# Patient Record
Sex: MALE | Race: WHITE | Employment: FULL TIME | URBAN - METROPOLITAN AREA
[De-identification: names, ages, dates, MRNs, and addresses within clinical notes are randomized per-mention and may not be internally consistent; named-entity substitution may affect disease eponyms.]

---

## 2017-01-31 RX ORDER — DEXTROAMPHETAMINE SACCHARATE, AMPHETAMINE ASPARTATE MONOHYDRATE, DEXTROAMPHETAMINE SULFATE AND AMPHETAMINE SULFATE 5; 5; 5; 5 MG/1; MG/1; MG/1; MG/1
20 CAPSULE, EXTENDED RELEASE ORAL
Qty: 90 CAP | Refills: 0 | Status: SHIPPED | OUTPATIENT
Start: 2017-01-31 | End: 2017-02-17 | Stop reason: SDUPTHER

## 2017-01-31 NOTE — TELEPHONE ENCOUNTER
Last date seen:8/12/16  Last date filled:11/25/16     [facility], No discrepancies were found.

## 2017-02-17 ENCOUNTER — OFFICE VISIT (OUTPATIENT)
Dept: INTERNAL MEDICINE CLINIC | Age: 57
End: 2017-02-17

## 2017-02-17 VITALS
DIASTOLIC BLOOD PRESSURE: 78 MMHG | RESPIRATION RATE: 12 BRPM | TEMPERATURE: 98.7 F | HEIGHT: 69 IN | OXYGEN SATURATION: 98 % | WEIGHT: 236.2 LBS | SYSTOLIC BLOOD PRESSURE: 126 MMHG | HEART RATE: 91 BPM | BODY MASS INDEX: 34.98 KG/M2

## 2017-02-17 DIAGNOSIS — Z00.00 ROUTINE GENERAL MEDICAL EXAMINATION AT A HEALTH CARE FACILITY: ICD-10-CM

## 2017-02-17 DIAGNOSIS — F90.0 ATTENTION DEFICIT HYPERACTIVITY DISORDER (ADHD), PREDOMINANTLY INATTENTIVE TYPE: Primary | ICD-10-CM

## 2017-02-17 RX ORDER — DEXTROAMPHETAMINE SACCHARATE, AMPHETAMINE ASPARTATE MONOHYDRATE, DEXTROAMPHETAMINE SULFATE AND AMPHETAMINE SULFATE 5; 5; 5; 5 MG/1; MG/1; MG/1; MG/1
20 CAPSULE, EXTENDED RELEASE ORAL
Qty: 90 CAP | Refills: 0 | Status: SHIPPED | OUTPATIENT
Start: 2017-02-17 | End: 2017-06-19 | Stop reason: SDUPTHER

## 2017-02-17 NOTE — MR AVS SNAPSHOT
Visit Information Date & Time Provider Department Dept. Phone Encounter #  
 2/17/2017  3:30 PM Sid Bridges MD Internist of [facility] [id number] Your Appointments 6/19/2017 12:30 PM  
PHYSICAL with Sid Bridges MD  
Internist of [facility]) Appt Note: rpe 6mos. rm  
 [address], [address] [place] Maureen [address] [place]  
  
   
 [address], [address] Upcoming Health Maintenance Date Due Pneumococcal 19-64 Highest Risk (2 of 3 - PCV13) 8/12/2017 COLONOSCOPY 8/8/2024 DTaP/Tdap/Td series (2 - Td) 7/24/2025 Allergies as of 2/17/2017  Review Complete On: 2/17/2017 By: Sid Bridges MD  
 No Known Allergies Current Immunizations  Reviewed on 8/10/2015 Name Date  
 TD Vaccine 7/3/2007 Tdap 7/24/2015 12:37 PM  
  
 Not reviewed this visit You Were Diagnosed With   
  
 Codes Comments Attention deficit hyperactivity disorder (ADHD), predominantly inattentive type    -  Primary ICD-10-CM: F90.0 ICD-9-CM: 314.00 Routine general medical examination at a health care facility     ICD-10-CM: Z00.00 ICD-9-CM: V70.0 Vitals BP Pulse Temp Resp Height(growth percentile) Weight(growth percentile) 126/78 91 98.7 °F (37.1 °C) (Oral) 12 5' 8.5\" (1.74 m) 236 lb 3.2 oz (107.1 kg) SpO2 BMI Smoking Status 98% 35.39 kg/m2 Never Smoker Vitals History BMI and BSA Data Body Mass Index Body Surface Area  
 35.39 kg/m 2 2.28 m 2 Preferred Pharmacy Pharmacy Name Phone [address], [address] [place] [address] [phone] [facility] [phone] Your Updated Medication List  
  
   
This list is accurate as of: 2/17/17  4:01 PM.  Always use your most recent med list.  
  
  
  
  
 ALEVE 220 mg tablet Generic drug:  naproxen sodium Take 220 mg by mouth two (2) times daily (with meals). amphetamine-dextroamphetamine XR 20 mg XR capsule Commonly known as:  ADDERALL XR Take 1 Cap (20 mg total) by mouth every morningEarliest Fill Date: 2/17/17.  
  
 multivitamin tablet Commonly known as:  ONE A DAY Take 1 Tab by mouth daily. Prescriptions Printed Refills  
 amphetamine-dextroamphetamine XR (ADDERALL XR) 20 mg XR capsule 0 Sig: Take 1 Cap (20 mg total) by mouth every morningEarliest Fill Date: 2/17/17. Class: Print Route: Oral  
  
Introducing [facility] SERVICES! Elif Christian introduces [employer] patient portal. Now you can access parts of your medical record, email your doctor's office, and request medication refills online. 1. In your internet browser, go to [email or web address] 2. Click on the First Time User? Click Here link in the Sign In box. You will see the New Member Sign Up page. 3. Enter your [employer] Access Code exactly as it appears below. You will not need to use this code after youve completed the sign-up process. If you do not sign up before the expiration date, you must request a new code. · [employer] Access Code: [email or web address] Expires: 5/18/2017  3:37 PM 
 
4. Enter the last four digits of your Social Security Number (xxxx) and Date of Birth (mm/dd/yyyy) as indicated and click Submit. You will be taken to the next sign-up page. 5. Create a [employer] ID. This will be your [employer] login ID and cannot be changed, so think of one that is secure and easy to remember. 6. Create a [employer] password. You can change your password at any time. 7. Enter your Password Reset Question and Answer. This can be used at a later time if you forget your password. 8. Enter your e-mail address. You will receive e-mail notification when new information is available in [address] 9. Click Sign Up. You can now view and download portions of your medical record. 10. Click the Download Summary menu link to download a portable copy of your medical information. If you have questions, please visit the Frequently Asked Questions section of the [employer] website. Remember, [employer] is NOT to be used for urgent needs. For medical emergencies, dial 911. Now available from your iPhone and Android! Please provide this summary of care documentation to your next provider. Your primary care clinician is listed as Claudia Moody. Estrella Vera. If you have any questions after today's visit, please call [phone].

## 2017-02-17 NOTE — PROGRESS NOTES
Africa Bonifacio 1960, is a 49 y.o. male, who is seen today for reevaluation of ADD, mild obesity also noting a rash recently. He felt something a little itchy 4 days ago and then 3 days ago noticed a definite rash in the right antecubital fossa. He thinks it might be poison ivy but has not really had much itching. He has tried calamine lotion a couple of times and that seems to help a little bit. He is doing well with Adderall as far as functioning and feels great overall but remains mildly obese, weight unchanged from a year ago. He would like to lose some weight. Past Medical History   Diagnosis Date    ADHD (attention deficit hyperactivity disorder)     FHx: prostate cancer     Heart burn      Hiatal hernia    Prostate CA (HCC)      T1 C. NX MX [place] score 3+3 adenocarcinoma of the prostate. Prebiopsy PSA was 6.2     Current Outpatient Prescriptions   Medication Sig Dispense Refill    amphetamine-dextroamphetamine XR (ADDERALL XR) 20 mg XR capsule Take 1 Cap (20 mg total) by mouth every morningEarliest Fill Date: 2/17/17. 90 Cap 0    naproxen sodium (ALEVE) 220 mg tablet Take 220 mg by mouth two (2) times daily (with meals).  multivitamin (ONE A DAY) tablet Take 1 Tab by mouth daily. Visit Vitals    /78    Pulse 91    Temp 98.7 °F (37.1 °C) (Oral)    Resp 12    Ht 5' 8.5\" (1.74 m)    Wt 236 lb 3.2 oz (107.1 kg)    SpO2 98%    BMI 35.39 kg/m2     Lungs are clear to percussion. Good breath sounds with no wheezing or crackles. Heart reveals a regular rhythm with normal S1 and S2 and no murmur gallop click or rub. Abdomen is soft and nontender with no obvious hepatosplenomegaly or masses and no bruits. There is a slight redness in the entire antecubital fossa on the right. Assessment: #1. ADD doing well. He will continue Adderall XR 20 mg daily. #2.  Probable poison ivy but minimally symptomatic. No treatment is indicated.   He will call me if the rash developed significant itching in which case triamcinolone to be used. #3.  Obesity unchanged from a year ago, body mass index 35.4. Advised him that he really should be gradually working on weight loss by cutting back calories especially. Follow-up 6 months for physical    Kavita Price. Kimi Chin MD FACP    Please note: This document has been produced using voice recognition software. Unrecognized errors in transcription may be present.

## 2017-06-19 ENCOUNTER — OFFICE VISIT (OUTPATIENT)
Dept: INTERNAL MEDICINE CLINIC | Age: 57
End: 2017-06-19

## 2017-06-19 VITALS
RESPIRATION RATE: 14 BRPM | WEIGHT: 239 LBS | OXYGEN SATURATION: 97 % | BODY MASS INDEX: 36.22 KG/M2 | SYSTOLIC BLOOD PRESSURE: 124 MMHG | HEART RATE: 103 BPM | DIASTOLIC BLOOD PRESSURE: 80 MMHG | HEIGHT: 68 IN | TEMPERATURE: 98.7 F

## 2017-06-19 DIAGNOSIS — Z00.00 ROUTINE GENERAL MEDICAL EXAMINATION AT A HEALTH CARE FACILITY: Primary | ICD-10-CM

## 2017-06-19 DIAGNOSIS — F90.0 ATTENTION DEFICIT HYPERACTIVITY DISORDER (ADHD), PREDOMINANTLY INATTENTIVE TYPE: ICD-10-CM

## 2017-06-19 RX ORDER — DEXTROAMPHETAMINE SACCHARATE, AMPHETAMINE ASPARTATE MONOHYDRATE, DEXTROAMPHETAMINE SULFATE AND AMPHETAMINE SULFATE 5; 5; 5; 5 MG/1; MG/1; MG/1; MG/1
20 CAPSULE, EXTENDED RELEASE ORAL
Qty: 90 CAP | Refills: 0 | Status: SHIPPED | OUTPATIENT
Start: 2017-06-19 | End: 2017-11-02 | Stop reason: SDUPTHER

## 2017-06-19 NOTE — PATIENT INSTRUCTIONS
Advance Directives: Care Instructions  Your Care Instructions  An advance directive is a legal way to state your wishes at the end of your life. It tells your family and your doctor what to do if you can no longer say what you want. There are two main types of advance directives. You can change them any time that your wishes change. · A living will tells your family and your doctor your wishes about life support and other treatment. · A durable power of  for health care lets you name a person to make treatment decisions for you when you can't speak for yourself. This person is called a health care agent. If you do not have an advance directive, decisions about your medical care may be made by a doctor or a  who doesn't know you. It may help to think of an advance directive as a gift to the people who care for you. If you have one, they won't have to make tough decisions by themselves. Follow-up care is a key part of your treatment and safety. Be sure to make and go to all appointments, and call your doctor if you are having problems. It's also a good idea to know your test results and keep a list of the medicines you take. How can you care for yourself at home? · Discuss your wishes with your loved ones and your doctor. This way, there are no surprises. · Many states have a unique form. Or you might use a universal form that has been approved by many states. This kind of form can sometimes be completed and stored online. Your electronic copy will then be available wherever you have a connection to the Internet. In most cases, doctors will respect your wishes even if you have a form from a different state. · You don't need a  to do an advance directive. But you may want to get legal advice. · Think about these questions when you prepare an advance directive:  ¨ Who do you want to make decisions about your medical care if you are not able to?  Many people choose a family member or close friend. ¨ Do you know enough about life support methods that might be used? If not, talk to your doctor so you understand. ¨ What are you most afraid of that might happen? You might be afraid of having pain, losing your independence, or being kept alive by machines. ¨ Where would you prefer to die? Choices include your home, a hospital, or a nursing home. ¨ Would you like to have information about hospice care to support you and your family? ¨ Do you want to donate organs when you die? ¨ Do you want certain Christian practices performed before you die? If so, put your wishes in the advance directive. · Read your advance directive every year, and make changes as needed. When should you call for help? Be sure to contact your doctor if you have any questions. Where can you learn more? Go to http://julieta-tennille.info/. Enter R264 in the search box to learn more about \"Advance Directives: Care Instructions. \"  Current as of: November 17, 2016  Content Version: 11.2  © 0476-5037 Healthwise, Incorporated. Care instructions adapted under license by [employer] (which disclaims liability or warranty for this information). If you have questions about a medical condition or this instruction, always ask your healthcare professional. [address] any warranty or liability for your use of this information.

## 2017-06-19 NOTE — PROGRESS NOTES
Ignacia Murray 1960, is a 49 y.o. male, who is seen today for routine physical exam and follow-up on ADD DJD obesity. He feels well overall and Adderall continues to help him concentrate get through his workday. 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 He has had recent pain in the left knee and prior to that of the left shoulder and sometimes in his right hand and uses Aleve 2 tablets each morning most days. Past Medical History:   Diagnosis Date    ADHD (attention deficit hyperactivity disorder)     FHx: prostate cancer     Heart burn     Hiatal hernia    Prostate CA (HCC)     T1 C. NX MX [place] score 3+3 adenocarcinoma of the prostate. Prebiopsy PSA was 6.2     Past Surgical History:   Procedure Laterality Date    HX COLONOSCOPY  8/8/14    diverticuli,  10 years    HX PROSTATECTOMY      HX UROLOGICAL  5/3/2013    prostate biopsy    HX UROLOGICAL  12/14    radical prostatectomy    HX VASECTOMY      Dr Breana Hernandez     Current Outpatient Prescriptions   Medication Sig Dispense Refill    amphetamine-dextroamphetamine XR (ADDERALL XR) 20 mg XR capsule Take 1 Cap (20 mg total) by mouth every morningEarliest Fill Date: 6/19/17. 90 Cap 0    naproxen sodium (ALEVE) 220 mg tablet Take 220 mg by mouth two (2) times daily (with meals).  multivitamin (ONE A DAY) tablet Take 1 Tab by mouth daily.        No Known Allergies    Social History     Social History    Marital status:      Spouse name: N/A    Number of children: N/A    Years of education: N/A     Social History Main Topics    Smoking status: Never Smoker    Smokeless tobacco: Never Used      Comment: has never smoked cigarettes     Alcohol use Yes      Comment: occasianally    Drug use: No    Sexual activity: Yes     Partners: Female     Other Topics Concern    None     Social History Narrative     Visit Vitals    /80    Pulse (!) 103    Temp 98.7 °F (37.1 °C) (Oral)    Resp 14    Ht 5' 8\" (1.727 m)    Wt 239 lb (108.4 kg)    SpO2 97%    BMI 36.34 kg/m2     The patient is a well-developed, well-nourished male in no apparent distress. HEENT: Pupils are equal and react to light and extraocular movements are full. Ear canals and tympanic membranes appear normal. Oral cavity appears normal with no oral lesions. Neck: Carotids are 2+ without bruits. No adenopathy or thyromegaly. Lungs are clear to percussion. I hear no wheezing, rales or rhonchi. Heart reveals a nondisplaced apical impulse in the fifth interspace at the midclavicular line. Rhythm is regular and no murmur, gallop, click or rub. Abdomen is soft and nontender with no hepatosplenomegaly or masses. No distention or tympany and normoactive bowel sounds. Extremities reveal no clubbing cyanosis or edema. Pulses are 2+ throughout. There is mild venous stasis changes in the lower legs. No suspicious skin growths. Assessment: #1. ADD doing well. He will continue Adderall XR 20 mg daily but he may omit taking it on days he does not need to concentrate such as through the summer and weekends. #2.  DJD currently bothering him mainly in the left knee. He will continue Aleve 2 tablets daily as needed and may use it up to 4 tablets daily as needed. #3.  Obesity with weight unchanged from last August.  He will work a little harder on try to get his weight down through diet especially and try to remain physically active. #4.  History of prostate cancer status post radical prostatectomy 2014. Rectal exam was not needed but we will check PSA for him. He did not get a chance to get his lab work done but will have that done tomorrow morning fasting. Follow-up 6 months or sooner if needed    Isaiah Saunders MD FACP    Please note: This document has been produced using voice recognition software. Unrecognized errors in transcription may be present.

## 2017-06-23 ENCOUNTER — HOSPITAL ENCOUNTER (OUTPATIENT)
Dept: LAB | Age: 57
Discharge: HOME | End: 2017-06-23

## 2017-06-23 LAB — SENTARA SPECIMEN COL,SENBCF: NORMAL

## 2017-06-23 PROCEDURE — 99001 SPECIMEN HANDLING PT-LAB: CPT | Performed by: INTERNAL MEDICINE

## 2017-06-24 LAB
A-G RATIO,AGRAT: 2.1 RATIO (ref 1.1–2.6)
ABSOLUTE LYMPHOCYTE COUNT, 10803: 1.7 K/UL (ref 1–4.8)
ALBUMIN SERPL-MCNC: 4.6 G/DL (ref 3.5–5)
ALP SERPL-CCNC: 55 U/L (ref 25–115)
ALT SERPL-CCNC: 25 U/L (ref 5–40)
ANION GAP SERPL CALC-SCNC: 19 MMOL/L
AST SERPL W P-5'-P-CCNC: 22 U/L (ref 10–37)
BASOPHILS # BLD: 0.1 K/UL (ref 0–0.2)
BASOPHILS NFR BLD: 2 % (ref 0–2)
BILIRUB SERPL-MCNC: 0.4 MG/DL (ref 0.2–1.2)
BUN SERPL-MCNC: 18 MG/DL (ref 6–22)
CALCIUM SERPL-MCNC: 9.3 MG/DL (ref 8.4–10.4)
CHLORIDE SERPL-SCNC: 99 MMOL/L (ref 98–110)
CHOLEST SERPL-MCNC: 162 MG/DL (ref 110–200)
CO2 SERPL-SCNC: 22 MMOL/L (ref 20–32)
CREAT SERPL-MCNC: 0.8 MG/DL (ref 0.5–1.2)
EOSINOPHIL # BLD: 0.2 K/UL (ref 0–0.5)
EOSINOPHIL NFR BLD: 3 % (ref 0–6)
ERYTHROCYTE [DISTWIDTH] IN BLOOD BY AUTOMATED COUNT: 15.8 % (ref 10–16)
GFRAA, 66117: >60
GFRNA, 66118: >60
GLOBULIN,GLOB: 2.2 G/DL (ref 2–4)
GLUCOSE SERPL-MCNC: 121 MG/DL (ref 65–99)
GRANULOCYTES,GRANS: 60 % (ref 40–75)
HCT VFR BLD AUTO: 47.8 % (ref 39.3–51.6)
HDLC SERPL-MCNC: 46 MG/DL (ref 40–59)
HGB BLD-MCNC: 15.5 G/DL (ref 13.1–17.2)
LDLC SERPL CALC-MCNC: 98 MG/DL (ref 50–99)
LYMPHOCYTES, LYMLT: 24 % (ref 27–45)
MCH RBC QN AUTO: 30 PG (ref 26–34)
MCHC RBC AUTO-ENTMCNC: 32 G/DL (ref 32–36)
MCV RBC AUTO: 91 FL (ref 80–95)
MONOCYTES # BLD: 0.7 K/UL (ref 0.1–0.9)
MONOCYTES NFR BLD: 10 % (ref 3–9)
NEUTROPHILS # BLD AUTO: 4.3 K/UL (ref 1.8–7.7)
PLATELET # BLD AUTO: 235 K/UL (ref 140–440)
PMV BLD AUTO: 12 FL (ref 6–10.8)
POTASSIUM SERPL-SCNC: 4.8 MMOL/L (ref 3.5–5.5)
PROT SERPL-MCNC: 6.8 G/DL (ref 6.4–8.3)
PSA SERPL-MCNC: <0.014 NG/ML
RBC # BLD AUTO: 5.26 M/UL (ref 3.8–5.8)
SODIUM SERPL-SCNC: 140 MMOL/L (ref 133–145)
TRIGL SERPL-MCNC: 93 MG/DL (ref 40–149)
TSH SERPL DL<=0.005 MIU/L-ACNC: 2.64 MCU/ML (ref 0.27–4.2)
VLDLC SERPL CALC-MCNC: 19 MG/DL (ref 8–30)
WBC # BLD AUTO: 7.1 K/UL (ref 4–11)

## 2017-06-26 NOTE — PROGRESS NOTES
Please notify the patient that his glucose is 121, above normal for fasting, he should strive to maintain normal weight. PSA is not detectable, cholesterol is excellent and all other chemistries are excellent.

## 2017-06-27 ENCOUNTER — TELEPHONE (OUTPATIENT)
Dept: INTERNAL MEDICINE CLINIC | Age: 57
End: 2017-06-27

## 2017-06-27 NOTE — TELEPHONE ENCOUNTER
----- Message from Kemi Kingsley MD sent at 6/26/2017  7:30 AM EDT -----  Please notify the patient that his glucose is 121, above normal for fasting, he should strive to maintain normal weight. PSA is not detectable, cholesterol is excellent and all other chemistries are excellent.

## 2017-08-10 DIAGNOSIS — Z00.00 ROUTINE GENERAL MEDICAL EXAMINATION AT A HEALTH CARE FACILITY: ICD-10-CM

## 2017-11-02 RX ORDER — DEXTROAMPHETAMINE SACCHARATE, AMPHETAMINE ASPARTATE MONOHYDRATE, DEXTROAMPHETAMINE SULFATE AND AMPHETAMINE SULFATE 5; 5; 5; 5 MG/1; MG/1; MG/1; MG/1
20 CAPSULE, EXTENDED RELEASE ORAL
Qty: 90 CAP | Refills: 0 | Status: SHIPPED | OUTPATIENT
Start: 2017-11-02 | End: 2017-12-26 | Stop reason: SDUPTHER

## 2017-11-02 NOTE — TELEPHONE ENCOUNTER
[facility] for [facility]derall XR as 08/03/2017 for a 90 day supply. There appears to be no inconsistencies in regards to the prescribing of this medication. Last Visit: 06/19/2017 with MD Panfilo Nieto    Next Appointment: noted to f/u in 6 months   Previous Refill Encounters: 06/19/2017 per MD Panfilo Nieto #[id number]     Requested Prescriptions     Pending Prescriptions Disp Refills    amphetamine-dextroamphetamine XR (ADDERALL XR) 20 mg XR capsule 90 Cap 0     Sig: Take 1 Cap (20 mg total) by mouth every morning.

## 2017-12-26 ENCOUNTER — OFFICE VISIT (OUTPATIENT)
Dept: INTERNAL MEDICINE CLINIC | Age: 57
End: 2017-12-26

## 2017-12-26 VITALS
TEMPERATURE: 98.6 F | OXYGEN SATURATION: 98 % | RESPIRATION RATE: 12 BRPM | HEART RATE: 84 BPM | WEIGHT: 236 LBS | BODY MASS INDEX: 35.77 KG/M2 | SYSTOLIC BLOOD PRESSURE: 128 MMHG | HEIGHT: 68 IN | DIASTOLIC BLOOD PRESSURE: 80 MMHG

## 2017-12-26 DIAGNOSIS — F90.0 ATTENTION DEFICIT HYPERACTIVITY DISORDER (ADHD), PREDOMINANTLY INATTENTIVE TYPE: Primary | ICD-10-CM

## 2017-12-26 DIAGNOSIS — R73.01 IMPAIRED FASTING GLUCOSE: ICD-10-CM

## 2017-12-26 DIAGNOSIS — E66.9 OBESITY (BMI 30-39.9): ICD-10-CM

## 2017-12-26 RX ORDER — DEXTROAMPHETAMINE SACCHARATE, AMPHETAMINE ASPARTATE MONOHYDRATE, DEXTROAMPHETAMINE SULFATE AND AMPHETAMINE SULFATE 5; 5; 5; 5 MG/1; MG/1; MG/1; MG/1
20 CAPSULE, EXTENDED RELEASE ORAL
Qty: 90 CAP | Refills: 0 | Status: CANCELLED | OUTPATIENT
Start: 2017-12-26

## 2017-12-26 RX ORDER — DEXTROAMPHETAMINE SACCHARATE, AMPHETAMINE ASPARTATE MONOHYDRATE, DEXTROAMPHETAMINE SULFATE AND AMPHETAMINE SULFATE 5; 5; 5; 5 MG/1; MG/1; MG/1; MG/1
20 CAPSULE, EXTENDED RELEASE ORAL
Qty: 90 CAP | Refills: 0 | Status: SHIPPED | OUTPATIENT
Start: 2017-12-26 | End: 2018-03-27 | Stop reason: SDUPTHER

## 2017-12-26 NOTE — PROGRESS NOTES
Harry Mohr 1960, is a 56 y.o. male, who is seen today for reevaluation of ADD obesity impaired fasting glucose. He lost 10 pounds before Halloween and has gained 10 pounds back. His weight is the same as it was in February at this point. He knows that he needs to lose weight. He has ADD and is doing well on Adderall. He takes it correctly and is sleeping well and functioning well, he is able to concentrate much better than when he was not on Adderall. Past Medical History:   Diagnosis Date    ADHD (attention deficit hyperactivity disorder)     FHx: prostate cancer     Heart burn     Hiatal hernia    Prostate CA (HCC)     T1 C. NX MX [place] score 3+3 adenocarcinoma of the prostate. Prebiopsy PSA was 6.2     Current Outpatient Prescriptions   Medication Sig Dispense Refill    amphetamine-dextroamphetamine XR (ADDERALL XR) 20 mg XR capsule Take 1 Cap (20 mg total) by mouth every morning. Max Daily Amount: 20 mg 90 Cap 0    naproxen sodium (ALEVE) 220 mg tablet Take 220 mg by mouth two (2) times daily (with meals).  multivitamin (ONE A DAY) tablet Take 1 Tab by mouth daily. Visit Vitals    /80    Pulse 84    Temp 98.6 °F (37 °C) (Oral)    Resp 12    Ht 5' 8\" (1.727 m)    Wt 236 lb (107 kg)    SpO2 98%    BMI 35.88 kg/m2     Heart reveals a regular rhythm with no murmur gallop click or rub. Lungs are clear to percussion. Good breath sounds with no wheezing or crackles. No edema. Assessment: #1. ADD doing well, he will continue Adderall XR 20 mg daily. #2.  Obesity with body mass index almost 36, I told him he needs to lose weight and that his glucose was elevated 6 months ago and that is even more reason to lose weight, trying to avoid becoming diabetic. Follow-up in 3 months for a brief checkup and we will plan to do a glucose and hemoglobin A1c in 6 months. Isaiah Bee MD FACP    Please note:   This document has been produced using voice recognition software. Unrecognized errors in transcription may be present.

## 2017-12-26 NOTE — MR AVS SNAPSHOT
Visit Information Date & Time Provider Department Dept. Phone Encounter #  
 12/26/2017 10:00 AM Farhan Pike MD [facility] of [address] [place] [address] Road [phone] [id number] Follow-up Instructions Return in about 3 months (around 3/26/2018). Upcoming Health Maintenance Date Due Pneumococcal 19-64 Highest Risk (3 of 3 - PCV13) 12/26/2018 COLONOSCOPY 8/8/2024 DTaP/Tdap/Td series (2 - Td) 7/24/2025 Allergies as of 12/26/2017  Review Complete On: 12/26/2017 By: Farhan Pike MD  
 No Known Allergies Current Immunizations  Reviewed on 12/26/2017 Name Date  
 TD Vaccine 7/3/2007 Tdap 7/24/2015 12:37 PM  
  
 Reviewed by Farhan Pike MD on 12/26/2017 at 10:17 AM  
You Were Diagnosed With   
  
 Codes Comments Attention deficit hyperactivity disorder (ADHD), predominantly inattentive type    -  Primary ICD-10-CM: F90.0 ICD-9-CM: 314.00 Vitals BP Pulse Temp Resp Height(growth percentile) Weight(growth percentile) 128/80 84 98.6 °F (37 °C) (Oral) 12 5' 8\" (1.727 m) 236 lb (107 kg) SpO2 BMI Smoking Status 98% 35.88 kg/m2 Never Smoker Vitals History BMI and BSA Data Body Mass Index Body Surface Area  
 35.88 kg/m 2 2.27 m 2 Preferred Pharmacy Pharmacy Name Phone [address], [address] [phone] [facility] [phone] Your Updated Medication List  
  
   
This list is accurate as of: 12/26/17 10:23 AM.  Always use your most recent med list.  
  
  
  
  
 ALEVE 220 mg tablet Generic drug:  naproxen sodium Take 220 mg by mouth two (2) times daily (with meals). amphetamine-dextroamphetamine XR 20 mg XR capsule Commonly known as:  ADDERALL XR Take 1 Cap (20 mg total) by mouth every morning. Max Daily Amount: 20 mg  
  
 multivitamin tablet Commonly known as:  ONE A DAY Take 1 Tab by mouth daily. Prescriptions Printed Refills  
 amphetamine-dextroamphetamine XR (ADDERALL XR) 20 mg XR capsule 0 Sig: Take 1 Cap (20 mg total) by mouth every morning. Max Daily Amount: 20 mg  
 Class: Print Route: Oral  
  
Follow-up Instructions Return in about 3 months (around 3/26/2018). Introducing [facility] SERVICES! Leyla Griffin introduces [employer] patient portal. Now you can access parts of your medical record, email your doctor's office, and request medication refills online. 1. In your internet browser, go to [email or web address] 2. Click on the First Time User? Click Here link in the Sign In box. You will see the New Member Sign Up page. 3. Enter your [employer] Access Code exactly as it appears below. You will not need to use this code after youve completed the sign-up process. If you do not sign up before the expiration date, you must request a new code. · [employer] Access Code: [email or web address] Expires: 3/26/2018 10:01 AM 
 
4. Enter the last four digits of your Social Security Number (xxxx) and Date of Birth (mm/dd/yyyy) as indicated and click Submit. You will be taken to the next sign-up page. 5. Create a [employer] ID. This will be your [employer] login ID and cannot be changed, so think of one that is secure and easy to remember. 6. Create a [employer] password. You can change your password at any time. 7. Enter your Password Reset Question and Answer. This can be used at a later time if you forget your password. 8. Enter your e-mail address. You will receive e-mail notification when new information is available in [address] 9. Click Sign Up. You can now view and download portions of your medical record. 10. Click the Download Summary menu link to download a portable copy of your medical information. If you have questions, please visit the Frequently Asked Questions section of the [employer] website.  Remember, [employer] is NOT to be used for urgent needs. For medical emergencies, dial 911. Now available from your iPhone and Android! Please provide this summary of care documentation to your next provider. Your primary care clinician is listed as Muriel Lombardo. Tomas Esposito. If you have any questions after today's visit, please call [phone].

## 2018-01-12 ENCOUNTER — OFFICE VISIT (OUTPATIENT)
Dept: INTERNAL MEDICINE CLINIC | Age: 58
End: 2018-01-12

## 2018-01-12 VITALS
SYSTOLIC BLOOD PRESSURE: 122 MMHG | HEIGHT: 68 IN | WEIGHT: 235 LBS | HEART RATE: 77 BPM | DIASTOLIC BLOOD PRESSURE: 84 MMHG | BODY MASS INDEX: 35.61 KG/M2 | OXYGEN SATURATION: 97 % | RESPIRATION RATE: 14 BRPM | TEMPERATURE: 98.7 F

## 2018-01-12 DIAGNOSIS — R31.9 HEMATURIA, UNSPECIFIED TYPE: ICD-10-CM

## 2018-01-12 DIAGNOSIS — R39.9 UTI SYMPTOMS: Primary | ICD-10-CM

## 2018-01-12 DIAGNOSIS — M54.50 BILATERAL LOW BACK PAIN WITHOUT SCIATICA, UNSPECIFIED CHRONICITY: ICD-10-CM

## 2018-01-12 LAB
BILIRUB UR QL STRIP: NEGATIVE
GLUCOSE UR-MCNC: NEGATIVE MG/DL
KETONES P FAST UR STRIP-MCNC: NEGATIVE MG/DL
PH UR STRIP: 5.5 [PH] (ref 4.6–8)
PROT UR QL STRIP: NEGATIVE
SP GR UR STRIP: 1.02 (ref 1–1.03)
UA UROBILINOGEN AMB POC: NORMAL (ref 0.2–1)
URINALYSIS CLARITY POC: NORMAL
URINALYSIS COLOR POC: NORMAL
URINE BLOOD POC: NORMAL
URINE LEUKOCYTES POC: NEGATIVE
URINE NITRITES POC: NEGATIVE

## 2018-01-12 NOTE — PROGRESS NOTES
Therese Billingsley is a 56 y.o.  male and presents with    Chief Complaint   Patient presents with    Urinary Pain     lower back pain, episodes of blood in urine for months more frequent past week pt states he has noticed blood in his urine daily x 1 week       Subjective:  HPI   Mr. Ignacio Khoury presents today with complaints of 1-2 years ago started to notice blood in the urine at the end of urination, occuring infrequently. Described as dark red, small amount, denies pain or pulling sensation to groin, burning on urination, rashes or discharge. One time a \"twinge\" of pain and one time thought possibly more cloudy appearance. No hx of kidney stones. History of Radical Prostatectomy. \"About one month or so but definitely over the last couple of weeks\" saw a small amount of blood, still as described above. Generalized low back pain started today, described as achy. Denies fever, chills, n/v/d/c, cold symptoms, rashes, he is sexually active, no condom use, states in a monogomous relationship. Last seen by urology 6 mo -1 year after radical prostatectomy. Additional Concerns: none     ROS   Review of Systems   Constitutional: Negative for chills, diaphoresis, fever, malaise/fatigue and weight loss. HENT: Negative. Eyes: Negative. Respiratory: Negative. Cardiovascular: Negative for chest pain, palpitations and leg swelling. Gastrointestinal: Negative for abdominal pain, blood in stool, constipation, diarrhea, melena, nausea and vomiting. Genitourinary: Positive for hematuria. Negative for dysuria, flank pain, frequency and urgency. Musculoskeletal: Positive for back pain. Skin: Negative for rash. Neurological: Negative for dizziness, focal weakness, weakness and headaches. No Known Allergies    Current Outpatient Prescriptions   Medication Sig Dispense Refill    amphetamine-dextroamphetamine XR (ADDERALL XR) 20 mg XR capsule Take 1 Cap (20 mg total) by mouth every morning.   Max Daily Amount: 20 mg 90 Cap 0    naproxen sodium (ALEVE) 220 mg tablet Take 220 mg by mouth two (2) times daily (with meals).  multivitamin (ONE A DAY) tablet Take 1 Tab by mouth daily. Social History     Social History    Marital status:      Spouse name: N/A    Number of children: N/A    Years of education: N/A     Occupational History    Not on file. Social History Main Topics    Smoking status: Never Smoker    Smokeless tobacco: Never Used      Comment: has never smoked cigarettes     Alcohol use Yes      Comment: occasianally    Drug use: No    Sexual activity: Yes     Partners: Female     Other Topics Concern    Not on file     Social History Narrative       Past Medical History:   Diagnosis Date    ADHD (attention deficit hyperactivity disorder)     FHx: prostate cancer     Heart burn     Hiatal hernia    Prostate CA ([address]     T1 C. NX MX [place] score 3+3 adenocarcinoma of the prostate.  Prebiopsy PSA was 6.2       Past Surgical History:   Procedure Laterality Date    HX COLONOSCOPY  8/8/14    diverticuli,  10 years    HX PROSTATECTOMY      HX UROLOGICAL  5/3/2013    prostate biopsy    HX UROLOGICAL  12/14    radical prostatectomy    HX VASECTOMY      Dr Kemi Dill       Family History   Problem Relation Age of Onset    Cancer Father      prostate cancer in his late 62s, carlos well into his 76s    Stroke Mother        Objective:  Vitals:    01/12/18 1404   BP: 122/84   Pulse: 77   Resp: 14   Temp: 98.7 °F (37.1 °C)   TempSrc: Oral   SpO2: 97%   Weight: 235 lb (106.6 kg)   Height: 5' 8\" (1.727 m)   PainSc:   1   PainLoc: Back       LABS   Results for orders placed or performed in visit on 01/12/18   AMB POC URINALYSIS DIP STICK AUTO W/O MICRO   Result Value Ref Range    Color (UA POC) Dark Yellow     Clarity (UA POC) Slightly Cloudy     Glucose (UA POC) Negative Negative    Bilirubin (UA POC) Negative Negative    Ketones (UA POC) Negative Negative    Specific gravity (UA POC) 1.025 1.001 - 1.035    Blood (UA POC) 3+ Negative    pH (UA POC) 5.5 4.6 - 8.0    Protein (UA POC) Negative Negative    Urobilinogen (UA POC) 0.2 mg/dL 0.2 - 1    Nitrites (UA POC) Negative Negative    Leukocyte esterase (UA POC) Negative Negative       TESTS  none    PE  Physical Exam   Constitutional: He is oriented to person, place, and time. He appears well-developed and well-nourished. No distress. HENT:   Head: Normocephalic and atraumatic. Cardiovascular: Normal rate, regular rhythm, normal heart sounds and intact distal pulses. Pulmonary/Chest: Effort normal and breath sounds normal. No respiratory distress. He has no wheezes. He has no rales. He exhibits no tenderness. Abdominal: Soft. Bowel sounds are normal. He exhibits no distension. There is no tenderness. There is no CVA tenderness. Musculoskeletal: Normal range of motion. Neurological: He is alert and oriented to person, place, and time. Skin: Skin is warm and dry. He is not diaphoretic. Psychiatric: He has a normal mood and affect. His behavior is normal. Judgment and thought content normal.       Assessment/Plan:    1. Chronic hematuria/low back pain- Low probability for cystitis-UA showed nitrites and leukocytes negative, 3+ blood. Urine culture pending. CT abd/pelvis w/o contrast ordered r/o nephrolithiasis. Referral to urology- history of prostatectomy. Patient refused STD testing today. Stop Ibuprofen and Aleve for arthritic pain and use Extra strength tylenol PRN pain. Follow up with new or worsening symptoms. Lab review: orders written for new lab studies as appropriate; see orders    Today's Visit:   Diagnoses and all orders for this visit:    1. UTI symptoms  -     AMB POC URINALYSIS DIP STICK AUTO W/O MICRO  -     CBC WITH AUTOMATED DIFF; Future  -     METABOLIC PANEL, BASIC; Future  -     CT ABD PELV WO CONT; Future    2. Hematuria, unspecified type  -     CBC WITH AUTOMATED DIFF;  Future  -     METABOLIC PANEL, BASIC; Future  -     CT ABD PELV WO CONT; Future  -     CULTURE, URINE; Future  -     Zenobia Listen Urology Ref [facility]    3. Bilateral low back pain without sciatica, unspecified chronicity  -     CBC WITH AUTOMATED DIFF; Future  -     METABOLIC PANEL, BASIC; Future  -     CT ABD PELV WO CONT; Future  -     CULTURE, URINE; Future  Jonatan Moore Urology Ref [facility]      Health Maintenance: Deferred to PCP    I have discussed the diagnosis with the patient and the intended plan as seen in the above orders. The patient has received an after-visit summary and questions were answered concerning future plans. I have discussed medication side effects and warnings with the patient as well. I have reviewed the plan of care with the patient, accepted their input and they are in agreement with the treatment goals. Follow-up Disposition: Not on File   More than 1/2 of this 20 minute visit was spent in counseling and coordination of care, as described above.     DARRYN Capellan  Internist of [address], [facility] [address]  Phone: [phone]  Fax: [phone]

## 2018-01-12 NOTE — PROGRESS NOTES
1. Have you been to the ER, urgent care clinic or hospitalized since your last visit? NO.     2. Have you seen or consulted any other health care providers outside of the [address] [place] [address] since your last visit (Include any pap smears or colon screening)? NO      Do you have an Advanced Directive? NO    Would you like information on Advanced Directives?  NO

## 2018-01-12 NOTE — MR AVS SNAPSHOT
Visit Information Date & Time Provider Department Dept. Phone Encounter #  
 1/12/2018  2:00 PM Verona Roe NP [facility] of [address] [place] [address] [phone] [id number] Your Appointments 3/27/2018  4:00 PM  
Office Visit with Miriam Mehta MD  
Internists of [address] [place] [address] MED [facility]) Appt Note: 3 month f/u  
 [address] [facility], [address] [address] [address]  
  
   
 [address], [address] Upcoming Health Maintenance Date Due Pneumococcal 19-64 Highest Risk (3 of 3 - PCV13) 12/26/2018 COLONOSCOPY 8/8/2024 DTaP/Tdap/Td series (2 - Td) 7/24/2025 Allergies as of 1/12/2018  Review Complete On: 1/12/2018 By: Verona Roe NP No Known Allergies Current Immunizations  Reviewed on 12/26/2017 Name Date  
 TD Vaccine 7/3/2007 Tdap 7/24/2015 12:37 PM  
  
 Not reviewed this visit You Were Diagnosed With   
  
 Codes Comments UTI symptoms    -  Primary ICD-10-CM: R39.9 ICD-9-CM: 788.99 Hematuria, unspecified type     ICD-10-CM: R31.9 ICD-9-CM: 599.70 Bilateral low back pain without sciatica, unspecified chronicity     ICD-10-CM: M54.5 ICD-9-CM: 724.2 Vitals BP Pulse Temp Resp Height(growth percentile) Weight(growth percentile) 122/84 (BP 1 Location: Left arm, BP Patient Position: Sitting) 77 98.7 °F (37.1 °C) (Oral) 14 5' 8\" (1.727 m) 235 lb (106.6 kg) SpO2 BMI Smoking Status 97% 35.73 kg/m2 Never Smoker Vitals History BMI and BSA Data Body Mass Index Body Surface Area 35.73 kg/m 2 2.26 m 2 Preferred Pharmacy Pharmacy Name Phone [address], [address] [facility] [phone] Your Updated Medication List  
  
   
This list is accurate as of: 1/12/18  2:41 PM.  Always use your most recent med list.  
  
  
  
  
 ALEVE 220 mg tablet Generic drug:  naproxen sodium Take 220 mg by mouth two (2) times daily (with meals). amphetamine-dextroamphetamine XR 20 mg XR capsule Commonly known as:  ADDERALL XR Take 1 Cap (20 mg total) by mouth every morning. Max Daily Amount: 20 mg  
  
 multivitamin tablet Commonly known as:  ONE A DAY Take 1 Tab by mouth daily. We Performed the Following AMB POC URINALYSIS DIP STICK AUTO W/O MICRO [13738 CPT(R)] To-Do List   
 01/12/2018 Lab:  CBC WITH AUTOMATED DIFF   
  
 01/12/2018 Imaging:  CT ABD PELV WO CONT   
  
 01/12/2018 Microbiology:  CULTURE, URINE   
  
 01/12/2018 Lab:  METABOLIC PANEL, BASIC Referral Information Referral ID Referred By Referred To  
  
 [id number] Damir Berman [facility] Not Available Visits Status Start Date End Date 1 New Request 1/12/18 1/12/19 If your referral has a status of pending review or denied, additional information will be sent to support the outcome of this decision. Introducing [facility] HE[facility]! [facility] introduces [employer] patient portal. Now you can access parts of your medical record, email your doctor's office, and request medication refills online. 1. In your internet browser, go to [email or web address] 2. Click on the First Time User? Click Here link in the Sign In box. You will see the New Member Sign Up page. 3. Enter your [employer] Access Code exactly as it appears below. You will not need to use this code after youve completed the sign-up process. If you do not sign up before the expiration date, you must request a new code. · [employer] Access Code: [email or web address] Expires: 3/26/2018 10:01 AM 
 
4. Enter the last four digits of your Social Security Number (xxxx) and Date of Birth (mm/dd/yyyy) as indicated and click Submit. You will be taken to the next sign-up page. 5. Create a [employer] ID.  This will be your [employer] login ID and cannot be changed, so think of one that is secure and easy to remember. 6. Create a [employer] password. You can change your password at any time. 7. Enter your Password Reset Question and Answer. This can be used at a later time if you forget your password. 8. Enter your e-mail address. You will receive e-mail notification when new information is available in [address] 9. Click Sign Up. You can now view and download portions of your medical record. 10. Click the Download Summary menu link to download a portable copy of your medical information. If you have questions, please visit the Frequently Asked Questions section of the [employer] website. Remember, [employer] is NOT to be used for urgent needs. For medical emergencies, dial 911. Now available from your iPhone and Android! Please provide this summary of care documentation to your next provider. Your primary care clinician is listed as Franca Aaron. Christopher Riedel. If you have any questions after today's visit, please call [phone].

## 2018-01-24 ENCOUNTER — HOSPITAL ENCOUNTER (OUTPATIENT)
Dept: CT IMAGING | Age: 58
Discharge: HOME | End: 2018-01-24
Attending: NURSE PRACTITIONER
Payer: COMMERCIAL

## 2018-01-24 DIAGNOSIS — M54.50 BILATERAL LOW BACK PAIN WITHOUT SCIATICA, UNSPECIFIED CHRONICITY: ICD-10-CM

## 2018-01-24 DIAGNOSIS — R39.9 UTI SYMPTOMS: ICD-10-CM

## 2018-01-24 DIAGNOSIS — R31.9 HEMATURIA, UNSPECIFIED TYPE: ICD-10-CM

## 2018-01-24 PROCEDURE — 74176 CT ABD & PELVIS W/O CONTRAST: CPT

## 2018-01-25 ENCOUNTER — TELEPHONE (OUTPATIENT)
Dept: INTERNAL MEDICINE CLINIC | Age: 58
End: 2018-01-25

## 2018-01-25 DIAGNOSIS — K42.9 UMBILICAL HERNIA WITHOUT OBSTRUCTION AND WITHOUT GANGRENE: Primary | ICD-10-CM

## 2018-01-25 NOTE — PROGRESS NOTES
Please inform the patient that his CT scan did not show any abnormality related to the kidneys, bladder, or pelvic area. See urology. The CT did show that there is a large umbilical hernia and I would like to send you to GI for consultation, if this is okay with the patient I will order that today, it also shows left lower abdominal diverticulosis. Additionally the CT did show that there are old fractures that involve the lumbar vertebrae at l3-l4 area this may be causing some of your low back pain as arthritis may have set in. I do not see a history of back trauma?  Tylenol is first line for arthritic pain as well as weight control, abdominal strengthening to improve core strength will help decrease strain to lower back and decrease jose

## 2018-01-25 NOTE — TELEPHONE ENCOUNTER
----- Message from Fito Garcia NP sent at 1/25/2018  9:54 AM EST -----  Please inform the patient that his CT scan did not show any abnormality related to the kidneys, bladder, or pelvic area. See urology. The CT did show that there is a large umbilical hernia and I would like to send you to GI for consultation, if this is okay with the patient I will order that today, it also shows left lower abdominal diverticulosis. Additionally the CT did show that there are old fractures that involve the lumbar vertebrae at l3-l4 area this may be causing some of your low back pain as arthritis may have set in. I do not see a history of back trauma?  Tylenol is first line for arthritic pain as well as weight control, abdominal strengthening to improve core strength will help decrease strain to lower back and decrease jose
LMTCB
Patient aware of message below, he verbalized understanding and is willing to see GI please
Pt. Returning call
Information obtained from pt's wife as pt is intubated and unable to communicate

## 2018-03-13 ENCOUNTER — APPOINTMENT (OUTPATIENT)
Dept: GENERAL RADIOLOGY | Age: 58
End: 2018-03-13
Attending: EMERGENCY MEDICINE
Payer: COMMERCIAL

## 2018-03-13 ENCOUNTER — OFFICE VISIT (OUTPATIENT)
Dept: ORTHOPEDIC SURGERY | Facility: CLINIC | Age: 58
End: 2018-03-13

## 2018-03-13 ENCOUNTER — HOSPITAL ENCOUNTER (EMERGENCY)
Age: 58
Discharge: HOME | End: 2018-03-13
Attending: EMERGENCY MEDICINE
Payer: COMMERCIAL

## 2018-03-13 VITALS
SYSTOLIC BLOOD PRESSURE: 134 MMHG | HEART RATE: 75 BPM | DIASTOLIC BLOOD PRESSURE: 83 MMHG | HEIGHT: 68 IN | OXYGEN SATURATION: 98 % | RESPIRATION RATE: 18 BRPM | BODY MASS INDEX: 34.8 KG/M2 | TEMPERATURE: 98.9 F | WEIGHT: 229.6 LBS

## 2018-03-13 VITALS
HEIGHT: 68 IN | OXYGEN SATURATION: 97 % | TEMPERATURE: 97.7 F | WEIGHT: 225 LBS | DIASTOLIC BLOOD PRESSURE: 88 MMHG | RESPIRATION RATE: 12 BRPM | BODY MASS INDEX: 34.1 KG/M2 | HEART RATE: 71 BPM | SYSTOLIC BLOOD PRESSURE: 144 MMHG

## 2018-03-13 DIAGNOSIS — S52.121A CLOSED DISPLACED FRACTURE OF HEAD OF RIGHT RADIUS, INITIAL ENCOUNTER: Primary | ICD-10-CM

## 2018-03-13 DIAGNOSIS — M25.531 RIGHT WRIST PAIN: ICD-10-CM

## 2018-03-13 DIAGNOSIS — S52.514A NONDISPLACED FRACTURE OF RIGHT RADIAL STYLOID PROCESS, INITIAL ENCOUNTER FOR CLOSED FRACTURE: Primary | ICD-10-CM

## 2018-03-13 DIAGNOSIS — M18.11 PRIMARY OSTEOARTHRITIS OF FIRST CARPOMETACARPAL JOINT OF RIGHT HAND: ICD-10-CM

## 2018-03-13 PROCEDURE — 99283 EMERGENCY DEPT VISIT LOW MDM: CPT

## 2018-03-13 PROCEDURE — 75810000053 HC SPLINT APPLICATION

## 2018-03-13 PROCEDURE — 73110 X-RAY EXAM OF WRIST: CPT

## 2018-03-13 RX ORDER — HYDROCODONE BITARTRATE AND ACETAMINOPHEN 5; 325 MG/1; MG/1
TABLET ORAL
Qty: 12 TAB | Refills: 0 | Status: SHIPPED | OUTPATIENT
Start: 2018-03-13 | End: 2018-03-27 | Stop reason: ALTCHOICE
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
## 2018-03-13 NOTE — DISCHARGE INSTRUCTIONS
Broken Radial Head of the Elbow: Care Instructions  Your Care Instructions  The radius is one of the two long bones in your lower arm. The radial head is the small part of this bone near the elbow. This bone may break (fracture) during sports or an accident. It may happen when your arm is hit or is used to protect you in a fall. Fractures can range from a small, hairline crack to a bone that is broken into two or more pieces. Your treatment depends on how bad the break is. Your doctor may have put your arm in a cast or splint. This will allow your elbow to heal or will keep it stable until you see another doctor. You also might wear a sling to help support your arm. It may take weeks or months for your elbow to heal. You can help it heal with some care at home. You heal best when you take good care of yourself. Eat a variety of healthy foods, and don't smoke. You may have had a sedative to help you relax. You may be unsteady after having sedation. It can take a few hours for the medicine's effects to wear off. Common side effects of sedation include nausea, vomiting, and feeling sleepy or tired. The doctor has checked you carefully, but problems can develop later. If you notice any problems or new symptoms, get medical treatment right away. Follow-up care is a key part of your treatment and safety. Be sure to make and go to all appointments, and call your doctor if you are having problems. It's also a good idea to know your test results and keep a list of the medicines you take. How can you care for yourself at home? · If the doctor gave you a sedative:  ¨ For 24 hours, don't do anything that requires attention to detail. It takes time for the medicine's effects to completely wear off. ¨ For your safety, do not drive or operate any machinery that could be dangerous. Wait until the medicine wears off and you can think clearly and react easily.   · Put ice or a cold pack on your arm for 10 to 20 minutes at a time. Try to do this every 1 to 2 hours for the next 3 days (when you are awake). Put a thin cloth between the ice and your cast or splint. Keep your cast or splint dry. · Follow the cast care instructions your doctor gives you. If you have a splint, do not take it off unless your doctor tells you to. · Be safe with medicines. Read and follow all instructions on the label. ¨ If the doctor gave you a prescription medicine for pain, take it as prescribed. ¨ If you are not taking a prescription pain medicine, ask your doctor if you can take an over-the-counter medicine. · Prop up the sore arm on a pillow when you ice it or anytime you sit or lie down during the next 3 days. Try to keep it above the level of your heart. This will help reduce swelling. · Follow instructions for exercises to keep your arm strong. · Wiggle your fingers and wrist often to reduce swelling and stiffness. When should you call for help? Call your doctor now or seek immediate medical care if:  ? · You have new or worse pain. ? · Your hand or fingers are cool or pale or change color. ? · Your cast or splint feels too tight. ? · You have tingling, weakness, or numbness in your hand or fingers. ? Watch closely for changes in your health, and be sure to contact your doctor if:  ? · You do not get better as expected. ? · You have problems with your cast or splint. Where can you learn more? Go to http://julieta-tennille.info/. Enter [id number] in the search box to learn more about \"Broken Radial Head of the Elbow: Care Instructions. \"  Current as of: March 21, 2017  Content Version: 11.4  © 9925-5131 [employer]. Care instructions adapted under license by [employer] (which disclaims liability or warranty for this information).  If you have questions about a medical condition or this instruction, always ask your healthcare professional. [employer] disclaims any warranty or liability for your use of this information.
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
## 2018-03-13 NOTE — PATIENT INSTRUCTIONS
Wearing a Cast: Care Instructions  Your Care Instructions  A cast protects a broken bone or other injury. Most casts are made of fiberglass, but plaster casts are still sometimes used. Once a cast is on, you can't remove it yourself. Your doctor will take it off. Follow-up care is a key part of your treatment and safety. Be sure to make and go to all appointments, and call your doctor if you are having problems. It's also a good idea to know your test results and keep a list of the medicines you take. How can you care for yourself at home? General care  · Follow your doctor's instructions for when you can first put weight on the cast. Fiberglass casts dry quickly and are soon ready to bear weight. But plaster casts may take several days before they are hard enough to use. When it's okay to put weight on your cast, do not stand or walk on it unless it is designed for walking. · Prop up the injured arm or leg on a pillow anytime you sit or lie down during the next 3 days. Try to keep it above the level of your heart. This will help reduce swelling. · If the fingers or toes on the limb with the cast were not injured, wiggle them every now and then. This helps move the blood and fluids in the injured limb. · Be safe with medicines. Read and follow all instructions on the label. ¨ If the doctor gave you a prescription medicine for pain, take it as prescribed. ¨ If you are not taking a prescription pain medicine, ask your doctor if you can take an over-the-counter medicine. · Keep up your muscle strength and tone as much as you can while protecting your injured limb or joint. Your doctor may want you to tense and relax the muscles protected by the cast. Check with your doctor or your physical or occupational therapist for instructions. Water and your cast  · Keep your cast dry. · Tape a sheet of plastic to cover your cast when you take a shower or bath or when you have any other contact with water. Moisture can collect under the cast and cause skin irritation and itching. It can make infection more likely if you have had surgery or have a wound under the cast.  Cast and skin care  · Try blowing cool air from a hair dryer or fan into the cast to help relieve itching. Never stick items under your cast to scratch the skin. · Don't use oils or lotions near your cast. If the skin gets red or irritated around the edge of the cast, you may pad the edges with a soft material or use tape to cover them. When should you call for help? Call your doctor now or seek immediate medical care if:  ? · You have increased or severe pain. ? · You feel a warm or painful spot under the cast.   ? · You have problems with your cast. For example:  ¨ The skin under the cast burns or stings. ¨ The cast feels too tight. ¨ There is a lot of swelling near the cast. (Some swelling is normal.)  ¨ You have a new fever. ¨ There is drainage or a bad smell coming from the cast.   ? · Your foot or hand is cool or pale or changes color. ? · You have trouble moving your fingers or toes. ? · You have symptoms of a blood clot in your arm or leg (called a deep vein thrombosis). These may include:  ¨ Pain in the arm, calf, back of the knee, thigh, or groin. ¨ Redness and swelling in the arm, leg, or groin. ? Watch closely for changes in your health, and be sure to contact your doctor if:  ? · The cast is breaking apart. ? · You are not getting better as expected. Where can you learn more? Go to http://julieta-tennille.info/. Enter 454 [id number] in the search box to learn more about \"Wearing a Cast: Care Instructions. \"  Current as of: March 21, 2017  Content Version: 11.4  © 0741-5081 [employer]. Care instructions adapted under license by [employer] (which disclaims liability or warranty for this information).  If you have questions about a medical condition or this instruction, always ask your healthcare professional. [address] any warranty or liability for your use of this information. Broken Wrist: Care Instructions  Your Care Instructions    Your wrist can break, or fracture, during sports, a fall, or other accidents. The break may happen when your wrist is hit or is used to protect you in a fall. Fractures can range from a small, hairline crack, to a bone or bones broken into two or more pieces. Your treatment depends on how bad the break is. Your doctor may have put your wrist in a cast or splint. This will help keep your wrist stable until your follow-up appointment. It may take weeks or months for your wrist to heal. You can help it heal with care at home. You heal best when you take good care of yourself. Eat a variety of healthy foods, and don't smoke. Follow-up care is a key part of your treatment and safety. Be sure to make and go to all appointments, and call your doctor if you are having problems. It's also a good idea to know your test results and keep a list of the medicines you take. How can you care for yourself at home? · Put ice or a cold pack on your wrist for 10 to 20 minutes at a time. Try to do this every 1 to 2 hours for the next 3 days (when you are awake). Put a thin cloth between the ice and your cast or splint. Keep your cast or splint dry. · Follow the splint or cast care instructions your doctor gives you. If you have a splint, do not take it off unless your doctor tells you to. Be careful not to put the splint on too tight. · Be safe with medicines. Take pain medicines exactly as directed. ¨ If the doctor gave you a prescription medicine for pain, take it as prescribed. ¨ If you are not taking a prescription pain medicine, ask your doctor if you can take an over-the-counter medicine. · Prop up your wrist on pillows when you sit or lie down in the first few days after the injury. Keep your wrist higher than the level of your heart. This will help reduce swelling. · Move your fingers often to reduce swelling and stiffness, but do not use that hand to grab or carry anything. · Follow instructions for exercises to keep your arm strong. When should you call for help? Call your doctor now or seek immediate medical care if:  ? · You have new or worse pain. ? · Your hand or fingers are cool or pale or change color. ? · Your cast or splint feels too tight. ? · You have tingling, weakness, or numbness in your hand or fingers. ? Watch closely for changes in your health, and be sure to contact your doctor if:  ? · You do not get better as expected. ? · You have problems with your cast or splint. Where can you learn more? Go to http://julieta-tennille.info/. Enter [id number] in the search box to learn more about \"Broken Wrist: Care Instructions. \"  Current as of: March 21, 2017  Content Version: 11.4  © 8781-5359 [employer]. Care instructions adapted under license by [employer] (which disclaims liability or warranty for this information). If you have questions about a medical condition or this instruction, always ask your healthcare professional. [address] any warranty or liability for your use of this information.

## 2018-03-13 NOTE — MR AVS SNAPSHOT
[address], [address] [facility] [address] [phone] Patient: Nini Avina MRN: [id number] :1960 Visit Information Date & Time Provider Department Dept. Phone Encounter #  
 3/13/2018  3:20 PM Sánchez Ruff, 27 [facility] [phone] [id number] Follow-up Instructions Return in about 3 weeks (around 4/3/2018). Follow-up and Disposition History Your Appointments 3/27/2018  4:00 PM  
Office Visit with Dylan Tinajero MD  
[facility]) Appt Note: 3 month f/u  
 [address] [facility], [address] [address] [address]  
  
   
 [address] [place] [address] Upcoming Health Maintenance Date Due Pneumococcal 19-64 Highest Risk (3 of 3 - PCV13) 2018 COLONOSCOPY 2024 DTaP/Tdap/Td series (2 - Td) 2025 Allergies as of 3/13/2018  Review Complete On: 3/13/2018 By: Sánchez Ruff MD  
 No Known Allergies Current Immunizations  Reviewed on 2017 Name Date  
 TD Vaccine 7/3/2007 Tdap 2015 12:37 PM  
  
 Not reviewed this visit You Were Diagnosed With   
  
 Codes Comments Nondisplaced fracture of right radial styloid process, initial encounter for closed fracture    -  Primary ICD-10-CM: V90.967D ICD-9-CM: 813.42 Right wrist pain     ICD-10-CM: M25.531 ICD-9-CM: 719.43 BMI 34.0-34.9,adult     ICD-10-CM: K34.24 
ICD-9-CM: V85.34 Primary osteoarthritis of first carpometacarpal joint of right hand     ICD-10-CM: M18.11 ICD-9-CM: 715.14 Vitals BP Pulse Temp Resp Height(growth percentile) Weight(growth percentile) 134/83 75 98.9 °F (37.2 °C) (Oral) 18 5' 8\" (1.727 m) 229 lb 9.6 oz (104.1 kg) SpO2 BMI Smoking Status 98% 34.91 kg/m2 Former Smoker BMI and BSA Data Body Mass Index Body Surface Area 34.91 kg/m 2 2.23 m 2 Preferred Pharmacy Pharmacy Name Phone [address], [address] [facility] [phone] Your Updated Medication List  
  
   
This list is accurate as of 3/13/18  4:58 PM.  Always use your most recent med list.  
  
  
  
  
 ALEVE 220 mg tablet Generic drug:  naproxen sodium Take 220 mg by mouth two (2) times daily (with meals). amphetamine-dextroamphetamine XR 20 mg XR capsule Commonly known as:  ADDERALL XR Take 1 Cap (20 mg total) by mouth every morning. Max Daily Amount: 20 mg HYDROcodone-acetaminophen 5-325 mg per tablet Commonly known as:  Luigi Lama Take 1-2 tablets PO every 4-6 hours as needed for pain control. If over the counter ibuprofen or acetaminophen was suggested, then only take the vicodin for pain not well controlled with the over the counter medication. multivitamin tablet Commonly known as:  ONE A DAY Take 1 Tab by mouth daily. We Performed the Following CAST SUP SHT ARM ADULT FBRGL [id number] [facility]] ID CLOSED RX DIST RAD/ULNA FX [id number] CPT(R)] Follow-up Instructions Return in about 3 weeks (around 4/3/2018). Patient Instructions Wearing a Cast: Care Instructions Your Care Instructions A cast protects a broken bone or other injury. Most casts are made of fiberglass, but plaster casts are still sometimes used. Once a cast is on, you can't remove it yourself. Your doctor will take it off. Follow-up care is a key part of your treatment and safety. Be sure to make and go to all appointments, and call your doctor if you are having problems. It's also a good idea to know your test results and keep a list of the medicines you take. How can you care for yourself at home? General care · Follow your doctor's instructions for when you can first put weight on the cast. Fiberglass casts dry quickly and are soon ready to bear weight. But plaster casts may take several days before they are hard enough to use. When it's okay to put weight on your cast, do not stand or walk on it unless it is designed for walking. · Prop up the injured arm or leg on a pillow anytime you sit or lie down during the next 3 days. Try to keep it above the level of your heart. This will help reduce swelling. · If the fingers or toes on the limb with the cast were not injured, wiggle them every now and then. This helps move the blood and fluids in the injured limb. · Be safe with medicines. Read and follow all instructions on the label. ¨ If the doctor gave you a prescription medicine for pain, take it as prescribed. ¨ If you are not taking a prescription pain medicine, ask your doctor if you can take an over-the-counter medicine. · Keep up your muscle strength and tone as much as you can while protecting your injured limb or joint. Your doctor may want you to tense and relax the muscles protected by the cast. Check with your doctor or your physical or occupational therapist for instructions. Water and your cast 
· Keep your cast dry. · Tape a sheet of plastic to cover your cast when you take a shower or bath or when you have any other contact with water. Moisture can collect under the cast and cause skin irritation and itching. It can make infection more likely if you have had surgery or have a wound under the cast. 
Cast and skin care · Try blowing cool air from a hair dryer or fan into the cast to help relieve itching. Never stick items under your cast to scratch the skin. · Don't use oils or lotions near your cast. If the skin gets red or irritated around the edge of the cast, you may pad the edges with a soft material or use tape to cover them. When should you call for help? Call your doctor now or seek immediate medical care if: 
? · You have increased or severe pain. ? · You feel a warm or painful spot under the cast.  
? · You have problems with your cast. For example: ¨ The skin under the cast burns or stings. ¨ The cast feels too tight. ¨ There is a lot of swelling near the cast. (Some swelling is normal.) ¨ You have a new fever. ¨ There is drainage or a bad smell coming from the cast.  
? · Your foot or hand is cool or pale or changes color. ? · You have trouble moving your fingers or toes. ? · You have symptoms of a blood clot in your arm or leg (called a deep vein thrombosis). These may include: 
¨ Pain in the arm, calf, back of the knee, thigh, or groin. ¨ Redness and swelling in the arm, leg, or groin. ? Watch closely for changes in your health, and be sure to contact your doctor if: 
? · The cast is breaking apart. ? · You are not getting better as expected. Where can you learn more? Go to http://julieta-tennille.info/. Enter 454 [id number] in the search box to learn more about \"Wearing a Cast: Care Instructions. \" Current as of: March 21, 2017 Content Version: 11.4 © 0434-5488 Ad.IQ. Care instructions adapted under license by Rapt (which disclaims liability or warranty for this information). If you have questions about a medical condition or this instruction, always ask your healthcare professional. [place] [address] any warranty or liability for your use of this information. Broken Wrist: Care Instructions Your Care Instructions Your wrist can break, or fracture, during sports, a fall, or other accidents. The break may happen when your wrist is hit or is used to protect you in a fall. Fractures can range from a small, hairline crack, to a bone or bones broken into two or more pieces. Your treatment depends on how bad the break is. Your doctor may have put your wrist in a cast or splint. This will help keep your wrist stable until your follow-up appointment.  It may take weeks or months for your wrist to heal. You can help it heal with care at home. You heal best when you take good care of yourself. Eat a variety of healthy foods, and don't smoke. Follow-up care is a key part of your treatment and safety. Be sure to make and go to all appointments, and call your doctor if you are having problems. It's also a good idea to know your test results and keep a list of the medicines you take. How can you care for yourself at home? · Put ice or a cold pack on your wrist for 10 to 20 minutes at a time. Try to do this every 1 to 2 hours for the next 3 days (when you are awake). Put a thin cloth between the ice and your cast or splint. Keep your cast or splint dry. · Follow the splint or cast care instructions your doctor gives you. If you have a splint, do not take it off unless your doctor tells you to. Be careful not to put the splint on too tight. · Be safe with medicines. Take pain medicines exactly as directed. ¨ If the doctor gave you a prescription medicine for pain, take it as prescribed. ¨ If you are not taking a prescription pain medicine, ask your doctor if you can take an over-the-counter medicine. · Prop up your wrist on pillows when you sit or lie down in the first few days after the injury. Keep your wrist higher than the level of your heart. This will help reduce swelling. · Move your fingers often to reduce swelling and stiffness, but do not use that hand to grab or carry anything. · Follow instructions for exercises to keep your arm strong. When should you call for help? Call your doctor now or seek immediate medical care if: 
? · You have new or worse pain. ? · Your hand or fingers are cool or pale or change color. ? · Your cast or splint feels too tight. ? · You have tingling, weakness, or numbness in your hand or fingers. ? Watch closely for changes in your health, and be sure to contact your doctor if: 
? · You do not get better as expected. ? · You have problems with your cast or splint. Where can you learn more? Go to http://julieta-tennille.info/. Enter [id number] in the search box to learn more about \"Broken Wrist: Care Instructions. \" Current as of: March 21, 2017 Content Version: 11.4 © 3322-4751 Seva Search. Care instructions adapted under license by [employer] (which disclaims liability or warranty for this information). If you have questions about a medical condition or this instruction, always ask your healthcare professional. [address] any warranty or liability for your use of this information. Patient Instructions History Introducing [facility] & HEALTH SERVICES! [facility] introduces Love Home Swap patient portal. Now you can access parts of your medical record, email your doctor's office, and request medication refills online. 1. In your internet browser, go to [email or web address] 2. Click on the First Time User? Click Here link in the Sign In box. You will see the New Member Sign Up page. 3. Enter your Love Home Swap Access Code exactly as it appears below. You will not need to use this code after youve completed the sign-up process. If you do not sign up before the expiration date, you must request a new code. · Love Home Swap Access Code: [email or web address] Expires: 3/26/2018 11:01 AM 
 
4. Enter the last four digits of your Social Security Number (xxxx) and Date of Birth (mm/dd/yyyy) as indicated and click Submit. You will be taken to the next sign-up page. 5. Create a Stellart ID. This will be your Love Home Swap login ID and cannot be changed, so think of one that is secure and easy to remember. 6. Create a Love Home Swap password. You can change your password at any time. 7. Enter your Password Reset Question and Answer. This can be used at a later time if you forget your password. 8. Enter your e-mail address.  You will receive e-mail notification when new information is available in Huddle. 9. Click Sign Up. You can now view and download portions of your medical record. 10. Click the Download Summary menu link to download a portable copy of your medical information. If you have questions, please visit the Frequently Asked Questions section of the Huddle website. Remember, Huddle is NOT to be used for urgent needs. For medical emergencies, dial 911. Now available from your iPhone and Android! Please provide this summary of care documentation to your next provider. Your primary care clinician is listed as Gonzalez Loyda. Corey Mireles. If you have any questions after today's visit, please call [phone].
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
## 2018-03-27 ENCOUNTER — OFFICE VISIT (OUTPATIENT)
Dept: INTERNAL MEDICINE CLINIC | Age: 58
End: 2018-03-27

## 2018-03-27 VITALS
WEIGHT: 223.6 LBS | TEMPERATURE: 98.6 F | SYSTOLIC BLOOD PRESSURE: 120 MMHG | RESPIRATION RATE: 14 BRPM | BODY MASS INDEX: 33.89 KG/M2 | OXYGEN SATURATION: 98 % | HEART RATE: 79 BPM | DIASTOLIC BLOOD PRESSURE: 84 MMHG | HEIGHT: 68 IN

## 2018-03-27 DIAGNOSIS — F90.0 ATTENTION DEFICIT HYPERACTIVITY DISORDER (ADHD), PREDOMINANTLY INATTENTIVE TYPE: Primary | ICD-10-CM

## 2018-03-27 DIAGNOSIS — E66.9 OBESITY (BMI 30-39.9): ICD-10-CM

## 2018-03-27 DIAGNOSIS — Z00.00 ROUTINE GENERAL MEDICAL EXAMINATION AT A HEALTH CARE FACILITY: ICD-10-CM

## 2018-03-27 DIAGNOSIS — R73.01 IMPAIRED FASTING GLUCOSE: ICD-10-CM

## 2018-03-27 DIAGNOSIS — Z12.5 PROSTATE CANCER SCREENING: ICD-10-CM

## 2018-03-27 RX ORDER — DEXTROAMPHETAMINE SACCHARATE, AMPHETAMINE ASPARTATE MONOHYDRATE, DEXTROAMPHETAMINE SULFATE AND AMPHETAMINE SULFATE 5; 5; 5; 5 MG/1; MG/1; MG/1; MG/1
20 CAPSULE, EXTENDED RELEASE ORAL
Qty: 90 CAP | Refills: 0 | Status: SHIPPED | OUTPATIENT
Start: 2018-03-27 | End: 2018-06-29 | Stop reason: SDUPTHER
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
## 2018-03-27 NOTE — MR AVS SNAPSHOT
303 [facility] 
 
 
 [address], [address] [place] [address] [facility] 
[phone] Patient: Georgia Traore MRN: [id number] :1960 Visit Information Date & Time Provider Department Dept. Phone Encounter #  
 3/27/2018  4:00 PM Carey Jolly MD [facility] 22 [id number] Your Appointments 4/3/2018  8:45 AM  
Follow Up with Valente Sweet PA-C  
[facility] Appt Note: 3 WK FU RT WRIST FX  
 [address] [facility], [address]  35 [facility]  
[phone]  
  
   
 340 [facility], [address] [facility] [address]  
  
    
 2018  3:00 PM  
PHYSICAL with Carey Jolly MD  
[facility] Appt Note: rpe  
 54[address] [facility], [facility] [address] [address] 455 [facility]  
  
   
 [address], [address] Upcoming Health Maintenance Date Due Pneumococcal 19-64 Highest Risk (3 of 3 - PCV13) 2018 COLONOSCOPY 2024 DTaP/Tdap/Td series (2 - Td) 2025 Allergies as of 3/27/2018  Review Complete On: 3/27/2018 By: Carey Jolly MD  
 No Known Allergies Current Immunizations  Reviewed on 2017 Name Date  
 TD Vaccine 7/3/2007 Tdap 2015 12:37 PM  
  
 Not reviewed this visit You Were Diagnosed With   
  
 Codes Comments Attention deficit hyperactivity disorder (ADHD), predominantly inattentive type    -  Primary ICD-10-CM: F90.0 ICD-9-CM: 314.00 Impaired fasting glucose     ICD-10-CM: R73.01 
ICD-9-CM: 790.21 Obesity (BMI 30-39. 9)     ICD-10-CM: E66.9 ICD-9-CM: 278.00 Routine general medical examination at a health care facility     ICD-10-CM: Z00.00 ICD-9-CM: V70.0 Prostate cancer screening     ICD-10-CM: Z12.5 ICD-9-CM: V76.44 Vitals BP Pulse Temp Resp Height(growth percentile) Weight(growth percentile) 120/88 (BP 1 Location: Left arm, BP Patient Position: Sitting) 79 98.6 °F (37 °C) (Oral) 14 5' 8\" (1.727 m) 223 lb 9.6 oz (101.4 kg) SpO2 BMI Smoking Status 98% 34 kg/m2 Former Smoker Vitals History BMI and BSA Data Body Mass Index Body Surface Area 34 kg/m 2 2.21 m 2 Preferred Pharmacy Pharmacy Name Phone [address], [address] 17 [facility] 22 [address] [facility] [phone] Your Updated Medication List  
  
   
This list is accurate as of 3/27/18  4:47 PM.  Always use your most recent med list.  
  
  
  
  
 ALEVE 220 mg tablet Generic drug:  naproxen sodium Take 220 mg by mouth two (2) times daily (with meals). amphetamine-dextroamphetamine XR 20 mg XR capsule Commonly known as:  ADDERALL XR Take 1 Cap (20 mg total) by mouth every morningEarliest Fill Date: 3/27/18. Max Daily Amount: 20 mg HYDROcodone-acetaminophen 5-325 mg per tablet Commonly known as:  Kendra Pete Take 1-2 tablets PO every 4-6 hours as needed for pain control. If over the counter ibuprofen or acetaminophen was suggested, then only take the vicodin for pain not well controlled with the over the counter medication. multivitamin tablet Commonly known as:  ONE A DAY Take 1 Tab by mouth daily. Prescriptions Printed Refills  
 amphetamine-dextroamphetamine XR (ADDERALL XR) 20 mg XR capsule 0 Sig: Take 1 Cap (20 mg total) by mouth every morningEarliest Fill Date: 3/27/18. Max Daily Amount: 20 mg  
 Class: Print Route: Oral  
  
To-Do List   
 Around 07/11/2018 Lab:  CBC WITH AUTOMATED DIFF Around 07/11/2018 Lab:  HEMOGLOBIN A1C WITH EAG Around 07/11/2018 Lab:  LIPID PANEL Around 07/11/2018 Lab:  METABOLIC PANEL, COMPREHENSIVE Around 07/11/2018 Lab:  PSA SCREENING (SCREENING) Around 07/11/2018 Lab:  T4, FREE Around 07/11/2018 Lab:  TSH 3RD GENERATION Around 07/11/2018 Lab:  URINALYSIS W/ RFLX MICROSCOPIC Introducing [facility] & HEALTH SERVICES! Luis Riddle introduces Particle Code patient portal. Now you can access parts of your medical record, email your doctor's office, and request medication refills online. 1. In your internet browser, go to [email or web address] 2. Click on the First Time User? Click Here link in the Sign In box. You will see the New Member Sign Up page. 3. Enter your Particle Code Access Code exactly as it appears below. You will not need to use this code after youve completed the sign-up process. If you do not sign up before the expiration date, you must request a new code. · Particle Code Access Code: [email or web address] Expires: 6/25/2018  3:27 PM 
 
4. Enter the last four digits of your Social Security Number (xxxx) and Date of Birth (mm/dd/yyyy) as indicated and click Submit. You will be taken to the next sign-up page. 5. Create a Particle Code ID. This will be your Particle Code login ID and cannot be changed, so think of one that is secure and easy to remember. 6. Create a Particle Code password. You can change your password at any time. 7. Enter your Password Reset Question and Answer. This can be used at a later time if you forget your password. 8. Enter your e-mail address. You will receive e-mail notification when new information is available in [address] 9. Click Sign Up. You can now view and download portions of your medical record. 10. Click the Download Summary menu link to download a portable copy of your medical information. If you have questions, please visit the Frequently Asked Questions section of the Particle Code website. Remember, Particle Code is NOT to be used for urgent needs. For medical emergencies, dial 911. Now available from your iPhone and Android! Please provide this summary of care documentation to your next provider. Your primary care clinician is listed as Kel Morse. Israel Quinteros. If you have any questions after today's visit, please call [phone].

## 2018-03-27 NOTE — PROGRESS NOTES
1. Have you been to the ER, urgent care clinic or hospitalized since your last visit? NO.     2. Have you seen or consulted any other health care providers outside of the [address] [place] [address] since your last visit (Include any pap smears or colon screening)? NO      Do you have an Advanced Directive? NO    Would you like information on Advanced Directives?  NO

## 2018-04-03 ENCOUNTER — OFFICE VISIT (OUTPATIENT)
Dept: ORTHOPEDIC SURGERY | Facility: CLINIC | Age: 58
End: 2018-04-03

## 2018-04-03 VITALS
HEART RATE: 59 BPM | BODY MASS INDEX: 34.77 KG/M2 | RESPIRATION RATE: 18 BRPM | OXYGEN SATURATION: 100 % | DIASTOLIC BLOOD PRESSURE: 88 MMHG | SYSTOLIC BLOOD PRESSURE: 151 MMHG | WEIGHT: 229.4 LBS | HEIGHT: 68 IN | TEMPERATURE: 96.6 F

## 2018-04-03 DIAGNOSIS — S52.514A NONDISPLACED FRACTURE OF RIGHT RADIAL STYLOID PROCESS, INITIAL ENCOUNTER FOR CLOSED FRACTURE: Primary | ICD-10-CM
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
## 2018-04-03 NOTE — MR AVS SNAPSHOT
[facility] 
 
 
 [address] [facility] [address] [facility] [address] 
[phone] Patient: Himanshu Casarez MRN: [id number] :1960 Visit Information Date & Time Provider Department Dept. Phone Encounter #  
 4/3/2018  8:45 AM Kaylin Cleveland, [address] [facility] [phone] [id number] Follow-up Instructions Return in about 2 weeks (around 2018). Follow-up and Disposition History Your Appointments 2018  3:00 PM  
PHYSICAL with Ottoniel Madsen MD  
[facility] [address]) Appt Note: rpe  
 [address] [facility], [facility] [id number] [place], [place] [address]  
  
   
 [address], [address] Upcoming Health Maintenance Date Due Pneumococcal 19-64 Highest Risk (3 of 3 - PCV13) 2018 COLONOSCOPY 2024 DTaP/Tdap/Td series (2 - Td) 2025 Allergies as of 4/3/2018  Review Complete On: 4/3/2018 By: Malka Lacy No Known Allergies Current Immunizations  Reviewed on 2017 Name Date  
 TD Vaccine 7/3/2007 Tdap 2015 12:37 PM  
  
 Not reviewed this visit You Were Diagnosed With   
  
 Codes Comments Nondisplaced fracture of right radial styloid process, initial encounter for closed fracture    -  Primary ICD-10-CM: E90.720B ICD-9-CM: 813.42 Vitals BP Pulse Temp Resp Height(growth percentile) Weight(growth percentile) 151/88 (!) 59 96.6 °F (35.9 °C) (Oral) 18 5' 8\" (1.727 m) 229 lb 6.4 oz (104.1 kg) SpO2 BMI Smoking Status 100% 34.88 kg/m2 Former Smoker BMI and BSA Data Body Mass Index Body Surface Area 34.88 kg/m 2 2.23 m 2 Preferred Pharmacy Pharmacy Name Phone [address], [place] [facility] [phone] Your Updated Medication List  
  
   
 This list is accurate as of 4/3/18 10:25 AM.  Always use your most recent med list.  
  
  
  
  
 ALEVE 220 mg tablet Generic drug:  naproxen sodium Take 220 mg by mouth two (2) times daily (with meals). amphetamine-dextroamphetamine XR 20 mg XR capsule Commonly known as:  ADDERALL XR Take 1 Cap (20 mg total) by mouth every morningEarliest Fill Date: 3/27/18. Max Daily Amount: 20 mg  
  
 multivitamin tablet Commonly known as:  ONE A DAY Take 1 Tab by mouth daily. We Performed the Following AMB POC XRAY, WRIST; COMPLETE, 3+ VIE [34332 CPT(R)] Follow-up Instructions Return in about 2 weeks (around 4/17/2018). Introducing [facility]! [employer] introduces CleanEdison patient portal. Now you can access parts of your medical record, email your doctor's office, and request medication refills online. 1. In your internet browser, go to [email or web address] 2. Click on the First Time User? Click Here link in the Sign In box. You will see the New Member Sign Up page. 3. Enter your CleanEdison Access Code exactly as it appears below. You will not need to use this code after youve completed the sign-up process. If you do not sign up before the expiration date, you must request a new code. · CleanEdison Access Code: [email or web address] Expires: 6/25/2018  3:27 PM 
 
4. Enter the last four digits of your Social Security Number (xxxx) and Date of Birth (mm/dd/yyyy) as indicated and click Submit. You will be taken to the next sign-up page. 5. Create a SwapMobt ID. This will be your CleanEdison login ID and cannot be changed, so think of one that is secure and easy to remember. 6. Create a SwapMobt password. You can change your password at any time. 7. Enter your Password Reset Question and Answer. This can be used at a later time if you forget your password. 8. Enter your e-mail address.  You will receive e-mail notification when new information is available in Meebler. 9. Click Sign Up. You can now view and download portions of your medical record. 10. Click the Download Summary menu link to download a portable copy of your medical information. If you have questions, please visit the Frequently Asked Questions section of the Meebler website. Remember, Meebler is NOT to be used for urgent needs. For medical emergencies, dial 911. Now available from your iPhone and Android! Please provide this summary of care documentation to your next provider. Your primary care clinician is listed as Clay Yoder. If you have any questions after today's visit, please call [phone].

## 2018-04-18 ENCOUNTER — OFFICE VISIT (OUTPATIENT)
Dept: ORTHOPEDIC SURGERY | Facility: CLINIC | Age: 58
End: 2018-04-18

## 2018-04-18 VITALS
WEIGHT: 223.8 LBS | HEART RATE: 73 BPM | TEMPERATURE: 98.3 F | BODY MASS INDEX: 33.92 KG/M2 | SYSTOLIC BLOOD PRESSURE: 145 MMHG | DIASTOLIC BLOOD PRESSURE: 84 MMHG | RESPIRATION RATE: 18 BRPM | HEIGHT: 68 IN | OXYGEN SATURATION: 100 %

## 2018-04-18 DIAGNOSIS — M25.531 RIGHT WRIST PAIN: Primary | ICD-10-CM
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
## 2018-05-02 ENCOUNTER — OFFICE VISIT (OUTPATIENT)
Dept: ORTHOPEDIC SURGERY | Facility: CLINIC | Age: 58
End: 2018-05-02

## 2018-05-02 DIAGNOSIS — M25.531 RIGHT WRIST PAIN: Primary | ICD-10-CM

## 2018-05-02 DIAGNOSIS — S52.514D CLOSED NONDISPLACED FRACTURE OF STYLOID PROCESS OF RIGHT RADIUS WITH ROUTINE HEALING, SUBSEQUENT ENCOUNTER: ICD-10-CM

## 2018-05-16 ENCOUNTER — OFFICE VISIT (OUTPATIENT)
Dept: ORTHOPEDIC SURGERY | Facility: CLINIC | Age: 58
End: 2018-05-16

## 2018-05-16 VITALS
OXYGEN SATURATION: 98 % | RESPIRATION RATE: 18 BRPM | BODY MASS INDEX: 34.65 KG/M2 | DIASTOLIC BLOOD PRESSURE: 88 MMHG | SYSTOLIC BLOOD PRESSURE: 143 MMHG | HEIGHT: 68 IN | TEMPERATURE: 98.7 F | HEART RATE: 82 BPM | WEIGHT: 228.6 LBS

## 2018-05-16 DIAGNOSIS — M25.531 RIGHT WRIST PAIN: Primary | ICD-10-CM

## 2018-05-16 DIAGNOSIS — S52.514D CLOSED NONDISPLACED FRACTURE OF STYLOID PROCESS OF RIGHT RADIUS WITH ROUTINE HEALING, SUBSEQUENT ENCOUNTER: ICD-10-CM
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
## 2018-06-23 ENCOUNTER — HOSPITAL ENCOUNTER (OUTPATIENT)
Dept: LAB | Age: 58
Discharge: HOME | End: 2018-06-23

## 2018-06-23 LAB
A-G RATIO,AGRAT: 1.6 RATIO (ref 1.1–2.6)
ABSOLUTE LYMPHOCYTE COUNT, 10803: 1.9 K/UL (ref 1–4.8)
ALBUMIN SERPL-MCNC: 4.2 G/DL (ref 3.5–5)
ALP SERPL-CCNC: 63 U/L (ref 25–115)
ALT SERPL-CCNC: 21 U/L (ref 5–40)
ANION GAP SERPL CALC-SCNC: 16 MMOL/L
AST SERPL W P-5'-P-CCNC: 21 U/L (ref 10–37)
AVG GLU, 10930: 119 MG/DL (ref 91–123)
BASOPHILS # BLD: 0 K/UL (ref 0–0.2)
BASOPHILS NFR BLD: 1 % (ref 0–2)
BILIRUB SERPL-MCNC: 0.3 MG/DL (ref 0.2–1.2)
BILIRUB UR QL: NEGATIVE
BUN SERPL-MCNC: 14 MG/DL (ref 6–22)
CALCIUM SERPL-MCNC: 8.9 MG/DL (ref 8.4–10.4)
CHLORIDE SERPL-SCNC: 103 MMOL/L (ref 98–110)
CHOLEST SERPL-MCNC: 136 MG/DL (ref 110–200)
CO2 SERPL-SCNC: 25 MMOL/L (ref 20–32)
CREAT SERPL-MCNC: 0.9 MG/DL (ref 0.5–1.2)
EOSINOPHIL # BLD: 0.1 K/UL (ref 0–0.5)
EOSINOPHIL NFR BLD: 2 % (ref 0–6)
EPITHELIAL,EPSU: ABNORMAL /HPF (ref 0–2)
ERYTHROCYTE [DISTWIDTH] IN BLOOD BY AUTOMATED COUNT: 15.6 % (ref 10–15.5)
GFRAA, 66117: >60
GFRNA, 66118: >60
GLOBULIN,GLOB: 2.6 G/DL (ref 2–4)
GLUCOSE SERPL-MCNC: 117 MG/DL (ref 70–99)
GLUCOSE UR QL: NEGATIVE MG/DL
GRANULOCYTES,GRANS: 58 % (ref 40–75)
HBA1C MFR BLD HPLC: 5.8 % (ref 4.8–5.9)
HCT VFR BLD AUTO: 44.9 % (ref 39.3–51.6)
HDLC SERPL-MCNC: 3.4 MG/DL (ref 0–5)
HDLC SERPL-MCNC: 40 MG/DL (ref 40–59)
HGB BLD-MCNC: 14.6 G/DL (ref 13.1–17.2)
HGB UR QL STRIP: ABNORMAL
KETONES UR QL STRIP.AUTO: NEGATIVE MG/DL
LDLC SERPL CALC-MCNC: 75 MG/DL (ref 50–99)
LEUKOCYTE ESTERASE: NEGATIVE
LYMPHOCYTES, LYMLT: 28 % (ref 20–45)
MCH RBC QN AUTO: 28 PG (ref 26–34)
MCHC RBC AUTO-ENTMCNC: 33 G/DL (ref 31–36)
MCV RBC AUTO: 87 FL (ref 80–95)
MONOCYTES # BLD: 0.7 K/UL (ref 0.1–1)
MONOCYTES NFR BLD: 10 % (ref 3–12)
NEUTROPHILS # BLD AUTO: 3.8 K/UL (ref 1.8–7.7)
NITRITE UR QL STRIP.AUTO: NEGATIVE
PH UR STRIP: 6 PH (ref 5–8)
PLATELET # BLD AUTO: 224 K/UL (ref 140–440)
PMV BLD AUTO: 11.9 FL (ref 9–13)
POTASSIUM SERPL-SCNC: 4.6 MMOL/L (ref 3.5–5.5)
PROT SERPL-MCNC: 6.8 G/DL (ref 6.4–8.3)
PROT UR QL STRIP: NEGATIVE MG/DL
PSA SERPL-MCNC: <0.014 NG/ML
RBC # BLD AUTO: 5.15 M/UL (ref 3.8–5.8)
RBC #/AREA URNS HPF: ABNORMAL /HPF
SENTARA SPECIMEN COL,SENBCF: NORMAL
SODIUM SERPL-SCNC: 144 MMOL/L (ref 133–145)
SP GR UR: 1.02 (ref 1–1.03)
T4 FREE SERPL-MCNC: 1.1 NG/DL (ref 0.9–1.8)
TRIGL SERPL-MCNC: 103 MG/DL (ref 40–149)
TSH SERPL DL<=0.005 MIU/L-ACNC: 2.63 MCU/ML (ref 0.27–4.2)
UROBILINOGEN UR STRIP-MCNC: <2 MG/DL
VLDLC SERPL CALC-MCNC: 21 MG/DL (ref 8–30)
WBC # BLD AUTO: 6.5 K/UL (ref 4–11)
WBC URNS QL MICRO: ABNORMAL /HPF (ref 0–2)

## 2018-06-23 PROCEDURE — 99001 SPECIMEN HANDLING PT-LAB: CPT | Performed by: INTERNAL MEDICINE

## 2018-06-29 ENCOUNTER — OFFICE VISIT (OUTPATIENT)
Dept: INTERNAL MEDICINE CLINIC | Age: 58
End: 2018-06-29

## 2018-06-29 VITALS
DIASTOLIC BLOOD PRESSURE: 76 MMHG | WEIGHT: 233.4 LBS | HEART RATE: 107 BPM | OXYGEN SATURATION: 96 % | TEMPERATURE: 99 F | SYSTOLIC BLOOD PRESSURE: 128 MMHG | RESPIRATION RATE: 12 BRPM | HEIGHT: 68 IN | BODY MASS INDEX: 35.37 KG/M2

## 2018-06-29 DIAGNOSIS — F90.0 ATTENTION DEFICIT HYPERACTIVITY DISORDER (ADHD), PREDOMINANTLY INATTENTIVE TYPE: ICD-10-CM

## 2018-06-29 DIAGNOSIS — Z00.00 ROUTINE GENERAL MEDICAL EXAMINATION AT A HEALTH CARE FACILITY: Primary | ICD-10-CM

## 2018-06-29 DIAGNOSIS — E66.01 SEVERE OBESITY (BMI 35.0-39.9): ICD-10-CM

## 2018-06-29 RX ORDER — DEXTROAMPHETAMINE SACCHARATE, AMPHETAMINE ASPARTATE MONOHYDRATE, DEXTROAMPHETAMINE SULFATE AND AMPHETAMINE SULFATE 5; 5; 5; 5 MG/1; MG/1; MG/1; MG/1
20 CAPSULE, EXTENDED RELEASE ORAL
Qty: 90 CAP | Refills: 0 | Status: SHIPPED | OUTPATIENT
Start: 2018-06-29 | End: 2018-11-06 | Stop reason: SDUPTHER

## 2018-06-29 NOTE — PROGRESS NOTES
Cecilio Pendleton 1960, is a 56 y.o. male, who is seen today for a routine physical exam.  He has been having problems with his wrist for years, getting worse. A local.  An orthopedic office recommended that he see someone in their group regarding removal of his trapezius. He feels well. He was treated for prostate cancer in 2013 with no evidence of recurrence. He has ADD and uses Adderall 20 mg daily with excellent results. No side effects. He uses this properly. He is obese and his weight is about the same as it was this time last year. He has impaired fasting glucose and he is trying to follow his diet better. Past Medical History:   Diagnosis Date    ADHD (attention deficit hyperactivity disorder)     Arthritis     FHx: prostate cancer     Heart burn     Hiatal hernia    Prostate CA ([address]     T1 C. NX MX [place] score 3+3 adenocarcinoma of the prostate. Prebiopsy PSA was 6.2     Current Outpatient Prescriptions   Medication Sig Dispense Refill    amphetamine-dextroamphetamine XR (ADDERALL XR) 20 mg XR capsule Take 1 Cap (20 mg total) by mouth every morningEarliest Fill Date: 6/29/18. Max Daily Amount: 20 mg 90 Cap 0    naproxen sodium (ALEVE) 220 mg tablet Take 220 mg by mouth two (2) times daily (with meals).  multivitamin (ONE A DAY) tablet Take 1 Tab by mouth daily.        Past Surgical History:   Procedure Laterality Date    HX COLONOSCOPY  8/8/14    diverticuli,  10 years    HX PROSTATECTOMY      HX UROLOGICAL  5/3/2013    prostate biopsy    HX UROLOGICAL  12/14    radical prostatectomy    HX VASECTOMY      Dr Katty Treviño History     Social History    Marital status:      Spouse name: N/A    Number of children: N/A    Years of education: N/A     Social History Main Topics    Smoking status: Former Smoker    Smokeless tobacco: Never Used      Comment: has never smoked cigarettes     Alcohol use Yes      Comment: occasianally    Drug use: No    Sexual activity: Yes     Partners: Female     Other Topics Concern    None     Social History Narrative     No Known Allergies  Visit Vitals    /76    Pulse (!) 107    Temp 99 °F (37.2 °C) (Oral)    Resp 12    Ht 5' 8\" (1.727 m)    Wt 233 lb 6.4 oz (105.9 kg)    SpO2 96%    BMI 35.49 kg/m2     Carotids are 2+ without bruits. No adenopathy or thyromegaly. Lungs are clear to percussion. Good breath sounds with no wheezing or crackles. Heart reveals a regular rhythm with normal S1 and S2 no murmur gallop click or rub. Apical impulse is not palpable. Abdomen is somewhat obese soft nontender with no obvious hepatosplenomegaly or masses and no bruits. Extremities reveal no clubbing cyanosis or edema. Pulses are 2+. Results for orders placed or performed in visit on 06/23/18   LIPID PANEL   Result Value Ref Range    Triglyceride 103 40 - 149 mg/dL    HDL Cholesterol 40 40 - 59 mg/dL    Cholesterol, total 136 110 - 200 mg/dL    CHOLESTEROL/HDL 3.4 0.0 - 5.0    LDL, calculated 75 50 - 99 mg/dL    VLDL, calculated 21 8 - 30 mg/dL   METABOLIC PANEL, COMPREHENSIVE   Result Value Ref Range    Glucose 117 (H) 70 - 99 mg/dL    BUN 14 6 - 22 mg/dL    Creatinine 0.9 0.5 - 1.2 mg/dL    Sodium 144 133 - 145 mmol/L    Potassium 4.6 3.5 - 5.5 mmol/L    Chloride 103 98 - 110 mmol/L    CO2 25 20 - 32 mmol/L    AST (SGOT) 21 10 - 37 U/L    ALT (SGPT) 21 5 - 40 U/L    Alk.  phosphatase 63 25 - 115 U/L    Bilirubin, total 0.3 0.2 - 1.2 mg/dL    Calcium 8.9 8.4 - 10.4 mg/dL    Protein, total 6.8 6.4 - 8.3 g/dL    Albumin 4.2 3.5 - 5.0 g/dL    A-G Ratio 1.6 1.1 - 2.6 ratio    Globulin 2.6 2.0 - 4.0 g/dL    Anion gap 16.0 mmol/L    GFRAA >60.0 >60.0    GFRNA >60.0 >60.0   PSA SCREENING (SCREENING)   Result Value Ref Range    Prostate Specific Ag <0.014 <=3.100 ng/mL   T4, FREE   Result Value Ref Range    T4, Free 1.1 0.9 - 1.8 ng/dL   TSH 3RD GENERATION   Result Value Ref Range    TSH 2.63 0.27 - 4.20 mcU/mL   CBC WITH AUTOMATED DIFF   Result Value Ref Range    WBC 6.5 4.0 - 11.0 K/uL    RBC 5.15 3.80 - 5.80 M/uL    HGB 14.6 13.1 - 17.2 g/dL    HCT 44.9 39.3 - 51.6 %    MCV 87 80 - 95 fL    MCH 28 26 - 34 pg    MCHC 33 31 - 36 g/dL    RDW 15.6 (H) 10.0 - 15.5 %    PLATELET 500 273 - 099 K/uL    MPV 11.9 9.0 - 13.0 fL    NEUTROPHILS 58 40 - 75 %    Lymphocytes 28 20 - 45 %    MONOCYTES 10 3 - 12 %    EOSINOPHILS 2 0 - 6 %    BASOPHILS 1 0 - 2 %    ABS. NEUTROPHILS 3.8 1.8 - 7.7 K/uL    ABSOLUTE LYMPHOCYTE COUNT 1.9 1.0 - 4.8 K/uL    ABS. MONOCYTES 0.7 0.1 - 1.0 K/uL    ABS. EOSINOPHILS 0.1 0.0 - 0.5 K/uL    ABS. BASOPHILS 0.0 0.0 - 0.2 K/uL   HEMOGLOBIN A1C W/O EAG   Result Value Ref Range    Hemoglobin A1c 5.8 4.8 - 5.9 %    AVG  91 - 123 mg/dL   URINALYSIS W/MICROSCOPIC   Result Value Ref Range    Specific Gravity 1.019 1.005 - 1.03    pH (UA) 6.0 5.0 - 8.0 pH    Protein Negative Negative, mg/dL    Glucose Negative Negative mg/dL    Ketone Negative Negative mg/dL    Bilirubin Negative Negative    Blood Moderate (A) Negative    Nitrites Negative Negative    Leukocyte Esterase Negative Negative    Urobilinogen <2.0 <2.0 mg/dL    WBC 3-5 (A) 0 - 2 /hpf    RBC 20-50 (A) Negative, /hpf    Epithelial cells 0-2 0 - 2 /hpf     Assessment: #1. Impaired fasting glucose, he is only slightly better than it was a year ago, encouraged him to do much better job with weight loss, cutting back on sweets starches and eating more vegetables salads and fruit. He understands the dangers of developing diabetes. #2.  Obesity minimally changed, is going to be working on that as above. #3.  ADD doing well. He will continue Adderall XR 20 mg daily and follow-up in 6 months for that. #4.  Chronic wrist pain, I recommended he see a actual hand specialist, either Dr. Kelley Gutierrez or Dr. Criss Lopez since we have no hand specialist in this immediate area. I gave him their phone numbers.   #5.  History of prostate cancer with no evidence of recurrence, PSA remains undetectable. We will recheck PSA in 1 year. Follow-up 6 months and we will plan for next physical in 1 year    Anastasia Lizarraga. Omayra Caldwell MD FACP    Please note: This document has been produced using voice recognition software. Unrecognized errors in transcription may be present.

## 2018-06-29 NOTE — MR AVS SNAPSHOT
[address] [place] [address] 
 
 
 [address], [address] 
[phone] Patient: Adonay Saunders MRN: [id number] :1960 Visit Information Date & Time Provider Department Dept. Phone Encounter #  
 2018  3:00 PM Alyse Roman MD [facility] [id number] Follow-up Instructions Return in about 6 months (around 2018). Your Appointments 2018 10:00 AM  
Office Visit with Alyse Romna MD  
[facility] Appt Note: 6 month follow up  
 [address], [address] [id number] [address] [place]  
  
   
 [address] [facility] [address] Upcoming Health Maintenance Date Due Influenza Age 5 to Adult 2018 Pneumococcal 19-64 Highest Risk (3 of 3 - PCV13) 2018 COLONOSCOPY 2024 DTaP/Tdap/Td series (2 - Td) 2025 Allergies as of 2018  Review Complete On: 2018 By: Alyse Roman MD  
 No Known Allergies Current Immunizations  Reviewed on 2017 Name Date  
 TD Vaccine 7/3/2007 Tdap 2015 12:37 PM  
  
 Not reviewed this visit You Were Diagnosed With   
  
 Codes Comments Routine general medical examination at a health care facility    -  Primary ICD-10-CM: Z00.00 ICD-9-CM: V70.0 Attention deficit hyperactivity disorder (ADHD), predominantly inattentive type     ICD-10-CM: F90.0 ICD-9-CM: 314.00 Severe obesity (BMI 35.0-39.9) (HCC)     ICD-10-CM: E66.01 
ICD-9-CM: 278.01 Vitals BP Pulse Temp Resp Height(growth percentile) Weight(growth percentile) 128/76 (!) 107 99 °F (37.2 °C) (Oral) 12 5' 8\" (1.727 m) 233 lb 6.4 oz (105.9 kg) SpO2 BMI Smoking Status 96% 35.49 kg/m2 Former Smoker Vitals History BMI and BSA Data  Body Mass Index Body Surface Area  
 35.49 kg/m 2 2.25 m 2  
  
  
 Preferred Pharmacy Pharmacy Name Phone [address], [address] [phone] [facility] [phone] Your Updated Medication List  
  
   
This list is accurate as of 6/29/18  3:48 PM.  Always use your most recent med list.  
  
  
  
  
 ALEVE 220 mg tablet Generic drug:  naproxen sodium Take 220 mg by mouth two (2) times daily (with meals). amphetamine-dextroamphetamine XR 20 mg XR capsule Commonly known as:  ADDERALL XR Take 1 Cap (20 mg total) by mouth every morningEarliest Fill Date: 6/29/18. Max Daily Amount: 20 mg  
  
 multivitamin tablet Commonly known as:  ONE A DAY Take 1 Tab by mouth daily. Prescriptions Printed Refills  
 amphetamine-dextroamphetamine XR (ADDERALL XR) 20 mg XR capsule 0 Sig: Take 1 Cap (20 mg total) by mouth every morningEarliest Fill Date: 6/29/18. Max Daily Amount: 20 mg  
 Class: Print Route: Oral  
  
Follow-up Instructions Return in about 6 months (around 12/29/2018). Introducing [facility]! [address] [place] [address] introduces [employer] patient portal. Now you can access parts of your medical record, email your doctor's office, and request medication refills online. 1. In your internet browser, go to [email or web address] 2. Click on the First Time User? Click Here link in the Sign In box. You will see the New Member Sign Up page. 3. Enter your [employer] Access Code exactly as it appears below. You will not need to use this code after youve completed the sign-up process. If you do not sign up before the expiration date, you must request a new code. · [employer] Access Code: [email or web address] Expires: 9/27/2018  3:28 PM 
 
4. Enter the last four digits of your Social Security Number (xxxx) and Date of Birth (mm/dd/yyyy) as indicated and click Submit. You will be taken to the next sign-up page. 5. Create a [employer] ID. This will be your [employer] login ID and cannot be changed, so think of one that is secure and easy to remember. 6. Create a [employer] password. You can change your password at any time. 7. Enter your Password Reset Question and Answer. This can be used at a later time if you forget your password. 8. Enter your e-mail address. You will receive e-mail notification when new information is available in [address] 9. Click Sign Up. You can now view and download portions of your medical record. 10. Click the Download Summary menu link to download a portable copy of your medical information. If you have questions, please visit the Frequently Asked Questions section of the [employer] website. Remember, [employer] is NOT to be used for urgent needs. For medical emergencies, dial 911. Now available from your iPhone and Android! Please provide this summary of care documentation to your next provider. Your primary care clinician is listed as Darien Child. Apryl White. If you have any questions after today's visit, please call [phone].

## 2018-07-11 DIAGNOSIS — R73.01 IMPAIRED FASTING GLUCOSE: ICD-10-CM

## 2018-07-11 DIAGNOSIS — Z12.5 PROSTATE CANCER SCREENING: ICD-10-CM

## 2018-07-11 DIAGNOSIS — Z00.00 ROUTINE GENERAL MEDICAL EXAMINATION AT A HEALTH CARE FACILITY: ICD-10-CM

## 2018-07-11 DIAGNOSIS — F90.0 ATTENTION DEFICIT HYPERACTIVITY DISORDER (ADHD), PREDOMINANTLY INATTENTIVE TYPE: ICD-10-CM

## 2018-11-06 DIAGNOSIS — F90.0 ATTENTION DEFICIT HYPERACTIVITY DISORDER (ADHD), PREDOMINANTLY INATTENTIVE TYPE: ICD-10-CM

## 2018-11-06 RX ORDER — DEXTROAMPHETAMINE SACCHARATE, AMPHETAMINE ASPARTATE MONOHYDRATE, DEXTROAMPHETAMINE SULFATE AND AMPHETAMINE SULFATE 5; 5; 5; 5 MG/1; MG/1; MG/1; MG/1
20 CAPSULE, EXTENDED RELEASE ORAL
Qty: 90 CAP | Refills: 0 | Status: SHIPPED | OUTPATIENT
Start: 2018-11-06 | End: 2018-12-31 | Stop reason: SDUPTHER

## 2018-11-06 NOTE — TELEPHONE ENCOUNTER
[facility] date for [facility]der[facility] as 08/05/2018. There appears to be no inconsistencies in regards to the prescribing of this medication. Last Visit: 06/29/2018 with MD Lexis Shanks   Next Appointment: 12/31/2018 with MD Lexis Shanks  Previous Refill Encounters: 06/29/2018 per MD Lexis Shanks #90    Requested Prescriptions     Pending Prescriptions Disp Refills    amphetamine-dextroamphetamine XR (ADDERALL XR) 20 mg XR capsule 90 Cap 0     Sig: Take 1 Cap (20 mg total) by mouth every morningEarliest Fill Date: 11/6/18.   Max Daily Amount: 20 mg

## 2018-12-31 ENCOUNTER — OFFICE VISIT (OUTPATIENT)
Dept: INTERNAL MEDICINE CLINIC | Age: 58
End: 2018-12-31

## 2018-12-31 VITALS
HEIGHT: 68 IN | OXYGEN SATURATION: 98 % | RESPIRATION RATE: 14 BRPM | BODY MASS INDEX: 35.89 KG/M2 | WEIGHT: 236.8 LBS | TEMPERATURE: 98.4 F | DIASTOLIC BLOOD PRESSURE: 72 MMHG | HEART RATE: 80 BPM | SYSTOLIC BLOOD PRESSURE: 118 MMHG

## 2018-12-31 DIAGNOSIS — Z12.5 PROSTATE CANCER SCREENING: ICD-10-CM

## 2018-12-31 DIAGNOSIS — F90.0 ATTENTION DEFICIT HYPERACTIVITY DISORDER (ADHD), PREDOMINANTLY INATTENTIVE TYPE: Primary | ICD-10-CM

## 2018-12-31 DIAGNOSIS — E66.01 SEVERE OBESITY WITH BODY MASS INDEX (BMI) OF 35.0 TO 39.9 WITH SERIOUS COMORBIDITY (HCC): ICD-10-CM

## 2018-12-31 DIAGNOSIS — Z00.00 ROUTINE GENERAL MEDICAL EXAMINATION AT A HEALTH CARE FACILITY: ICD-10-CM

## 2018-12-31 DIAGNOSIS — J06.9 VIRAL UPPER RESPIRATORY INFECTION: ICD-10-CM

## 2018-12-31 RX ORDER — DEXTROAMPHETAMINE SACCHARATE, AMPHETAMINE ASPARTATE MONOHYDRATE, DEXTROAMPHETAMINE SULFATE AND AMPHETAMINE SULFATE 5; 5; 5; 5 MG/1; MG/1; MG/1; MG/1
20 CAPSULE, EXTENDED RELEASE ORAL
Qty: 90 CAP | Refills: 0 | Status: SHIPPED | OUTPATIENT
Start: 2018-12-31 | End: 2019-05-02 | Stop reason: SDUPTHER

## 2018-12-31 NOTE — PROGRESS NOTES
Don Estimable 1960, is a 66 y.o. male, who is seen today for reevaluation of ADD, obesity, DJD and now complaining of cold symptoms for the last week. He is pretty sure is just a cold with some head congestion blowing his nose occasional coughing but no fever. He still feels the Adderall is doing very well for him able to concentrate very well and no perceived side effects. He is obese and is gained 3 more pounds over the last 6 months. Current Outpatient Medications Medication Sig Dispense Refill  amphetamine-dextroamphetamine XR (ADDERALL XR) 20 mg XR capsule Take 1 Cap (20 mg total) by mouth every morningEarliest Fill Date: 12/31/18. Max Daily Amount: 20 mg 90 Cap 0  
 naproxen sodium (ALEVE) 220 mg tablet Take 220 mg by mouth two (2) times daily (with meals).  multivitamin (ONE A DAY) tablet Take 1 Tab by mouth daily. Visit Vitals /72 (BP 1 Location: Right arm, BP Patient Position: Sitting) Pulse 80 Temp 98.4 °F (36.9 °C) (Oral) Resp 14 Ht 5' 8\" (1.727 m) Wt 236 lb 12.8 oz (107.4 kg) SpO2 98% BMI 36.01 kg/m² Past Medical History:  
Diagnosis Date  ADHD (attention deficit hyperactivity disorder)  Arthritis  FHx: prostate cancer  Heart burn Hiatal hernia  Prostate CA ([facility] [address]   
 T1 C. NX MX [place] score 3+3 adenocarcinoma of the prostate. Prebiopsy PSA was 6.2 Current Outpatient Medications Medication Sig Dispense Refill  amphetamine-dextroamphetamine XR (ADDERALL XR) 20 mg XR capsule Take 1 Cap (20 mg total) by mouth every morningEarliest Fill Date: 12/31/18. Max Daily Amount: 20 mg 90 Cap 0  
 naproxen sodium (ALEVE) 220 mg tablet Take 220 mg by mouth two (2) times daily (with meals).  multivitamin (ONE A DAY) tablet Take 1 Tab by mouth daily. Visit Vitals /72 (BP 1 Location: Right arm, BP Patient Position: Sitting) Pulse 80 Temp 98.4 °F (36.9 °C) (Oral) Resp 14 Ht 5' 8\" (1.727 m) Wt 236 lb 12.8 oz (107.4 kg) SpO2 98% BMI 36.01 kg/m² Carotids are 2+ without bruits. No adenopathy or thyromegaly. Lungs are clear to percussion. Good breath sounds with no wheezing or crackles. Heart reveals a regular rhythm with normal S1 and S2 no murmur gallop click or rub. Apical impulse is not palpable. Abdomen is obese soft nontender with no obvious hepatosplenic megaly or masses. Extremities reveal no clubbing cyanosis or edema. Assessment: #1. ADD doing well, will continue Adderall XR 20 mg daily. #2.  Obesity, of encouraged him to work on weight loss over the next 6 months. #3.  DJD doing well, he will continue Aleve as needed. #4.  Upper restaurant infection seems to be viral, he will call me if symptoms worsen. Follow-up in 6 months for physical or sooner if needed. He declines flu shot as usual. 
 
Isaiah Belle, [address] Please note: This document has been produced using voice recognition software. Unrecognized errors in transcription may be present.

## 2018-12-31 NOTE — PROGRESS NOTES
1. Have you been to the ER, urgent care clinic or hospitalized since your last visit? NO 
      
 
2. Have you seen or consulted any other health care providers outside of the [address] [place] [address] since your last visit (Include any pap smears or colon screening)? NO Do you have an Advanced Directive? NO Would you like information on Advanced Directives?  NO

## 2019-05-02 DIAGNOSIS — F90.0 ATTENTION DEFICIT HYPERACTIVITY DISORDER (ADHD), PREDOMINANTLY INATTENTIVE TYPE: ICD-10-CM

## 2019-05-02 NOTE — TELEPHONE ENCOUNTER
Last Visit: 12/31/18 with MD Vitcoria Bronson  Next Appointment: 7/1/19 with MD Victoria Bronson  Previous Refill Encounter(s): 12/31/18 #90    Requested Prescriptions     Pending Prescriptions Disp Refills    amphetamine-dextroamphetamine XR (ADDERALL XR) 20 mg XR capsule 90 Cap 0     Sig: Take 1 Cap by mouth every morning. Max Daily Amount: 20 mg.

## 2019-05-03 RX ORDER — DEXTROAMPHETAMINE SACCHARATE, AMPHETAMINE ASPARTATE MONOHYDRATE, DEXTROAMPHETAMINE SULFATE AND AMPHETAMINE SULFATE 5; 5; 5; 5 MG/1; MG/1; MG/1; MG/1
20 CAPSULE, EXTENDED RELEASE ORAL
Qty: 90 CAP | Refills: 0 | Status: SHIPPED | OUTPATIENT
Start: 2019-05-03 | End: 2019-07-01 | Stop reason: SDUPTHER

## 2019-06-24 ENCOUNTER — APPOINTMENT (OUTPATIENT)
Dept: INTERNAL MEDICINE CLINIC | Age: 59
End: 2019-06-24

## 2019-06-25 LAB
A-G RATIO,AGRAT: 1.8 RATIO (ref 1.1–2.6)
ABSOLUTE LYMPHOCYTE COUNT, 10803: 1.7 K/UL (ref 1–4.8)
ALBUMIN SERPL-MCNC: 4.4 G/DL (ref 3.5–5)
ALP SERPL-CCNC: 60 U/L (ref 25–115)
ALT SERPL-CCNC: 24 U/L (ref 5–40)
ANION GAP SERPL CALC-SCNC: 19 MMOL/L
AST SERPL W P-5'-P-CCNC: 27 U/L (ref 10–37)
BASOPHILS # BLD: 0 K/UL (ref 0–0.2)
BASOPHILS NFR BLD: 1 % (ref 0–2)
BILIRUB SERPL-MCNC: 0.3 MG/DL (ref 0.2–1.2)
BUN SERPL-MCNC: 17 MG/DL (ref 6–22)
CALCIUM SERPL-MCNC: 9.1 MG/DL (ref 8.4–10.4)
CHLORIDE SERPL-SCNC: 106 MMOL/L (ref 98–110)
CHOLEST SERPL-MCNC: 136 MG/DL (ref 110–200)
CO2 SERPL-SCNC: 21 MMOL/L (ref 20–32)
CREAT SERPL-MCNC: 0.8 MG/DL (ref 0.5–1.2)
EOSINOPHIL # BLD: 0.1 K/UL (ref 0–0.5)
EOSINOPHIL NFR BLD: 2 % (ref 0–6)
ERYTHROCYTE [DISTWIDTH] IN BLOOD BY AUTOMATED COUNT: 16 % (ref 10–15.5)
GFRAA, 66117: >60
GFRNA, 66118: >60
GLOBULIN,GLOB: 2.5 G/DL (ref 2–4)
GLUCOSE SERPL-MCNC: 112 MG/DL (ref 70–99)
GRANULOCYTES,GRANS: 65 % (ref 40–75)
HCT VFR BLD AUTO: 47.9 % (ref 39.3–51.6)
HDLC SERPL-MCNC: 3.8 MG/DL (ref 0–5)
HDLC SERPL-MCNC: 36 MG/DL (ref 40–59)
HGB BLD-MCNC: 15.2 G/DL (ref 13.1–17.2)
LDLC SERPL CALC-MCNC: 82 MG/DL (ref 50–99)
LYMPHOCYTES, LYMLT: 25 % (ref 20–45)
MCH RBC QN AUTO: 28 PG (ref 26–34)
MCHC RBC AUTO-ENTMCNC: 32 G/DL (ref 31–36)
MCV RBC AUTO: 90 FL (ref 80–95)
MONOCYTES # BLD: 0.5 K/UL (ref 0.1–1)
MONOCYTES NFR BLD: 8 % (ref 3–12)
NEUTROPHILS # BLD AUTO: 4.4 K/UL (ref 1.8–7.7)
PLATELET # BLD AUTO: 219 K/UL (ref 140–440)
PMV BLD AUTO: 12 FL (ref 9–13)
POTASSIUM SERPL-SCNC: 4.4 MMOL/L (ref 3.5–5.5)
PROT SERPL-MCNC: 6.9 G/DL (ref 6.4–8.3)
PSA SERPL-MCNC: <0.014 NG/ML
RBC # BLD AUTO: 5.35 M/UL (ref 3.8–5.8)
SODIUM SERPL-SCNC: 146 MMOL/L (ref 133–145)
TRIGL SERPL-MCNC: 90 MG/DL (ref 40–149)
VLDLC SERPL CALC-MCNC: 18 MG/DL (ref 8–30)
WBC # BLD AUTO: 6.9 K/UL (ref 4–11)

## 2019-07-01 ENCOUNTER — OFFICE VISIT (OUTPATIENT)
Dept: INTERNAL MEDICINE CLINIC | Age: 59
End: 2019-07-01

## 2019-07-01 VITALS
WEIGHT: 225.8 LBS | TEMPERATURE: 98.6 F | BODY MASS INDEX: 34.22 KG/M2 | DIASTOLIC BLOOD PRESSURE: 72 MMHG | HEIGHT: 68 IN | SYSTOLIC BLOOD PRESSURE: 112 MMHG | RESPIRATION RATE: 12 BRPM | HEART RATE: 91 BPM | OXYGEN SATURATION: 99 %

## 2019-07-01 DIAGNOSIS — Z85.46 HISTORY OF PROSTATE CANCER: ICD-10-CM

## 2019-07-01 DIAGNOSIS — E66.9 OBESITY (BMI 30-39.9): ICD-10-CM

## 2019-07-01 DIAGNOSIS — Z00.00 ROUTINE GENERAL MEDICAL EXAMINATION AT A HEALTH CARE FACILITY: Primary | ICD-10-CM

## 2019-07-01 DIAGNOSIS — F90.0 ATTENTION DEFICIT HYPERACTIVITY DISORDER (ADHD), PREDOMINANTLY INATTENTIVE TYPE: ICD-10-CM

## 2019-07-01 PROBLEM — E66.01 SEVERE OBESITY WITH BODY MASS INDEX (BMI) OF 35.0 TO 39.9 WITH SERIOUS COMORBIDITY (HCC): Status: RESOLVED | Noted: 2018-06-29 | Resolved: 2019-07-01

## 2019-07-01 RX ORDER — DEXTROAMPHETAMINE SACCHARATE, AMPHETAMINE ASPARTATE MONOHYDRATE, DEXTROAMPHETAMINE SULFATE AND AMPHETAMINE SULFATE 5; 5; 5; 5 MG/1; MG/1; MG/1; MG/1
20 CAPSULE, EXTENDED RELEASE ORAL
Qty: 90 CAP | Refills: 0 | Status: SHIPPED | OUTPATIENT
Start: 2019-07-01 | End: 2019-11-11 | Stop reason: SDUPTHER

## 2019-07-01 NOTE — PROGRESS NOTES
Raimundo Barksdale 1960, is a 66 y.o. male, who is seen today for a routine physical exam and follow-up on ADD impaired fasting glucose obesity and history of prostate cancer. Was treated for prostate cancer in August 2013 and PSA has been undetectable since then. He is noticed some itchiness of the sides of bottoms of feet lately and thought it might be athlete's foot but that type of medicine has not been helping. He also has a reddish area in his lower eyelid and he does note some mucus in his eyes in the morning but more so on that left side than on the right. Past Medical History:   Diagnosis Date    ADHD (attention deficit hyperactivity disorder)     Arthritis     FHx: prostate cancer     Heart burn     Hiatal hernia    Prostate CA ([address]     T1 C. NX MX [place] score 3+3 adenocarcinoma of the prostate. Prebiopsy PSA was 6.2     Past Surgical History:   Procedure Laterality Date    HX COLONOSCOPY  8/8/14    diverticuli,  10 years    HX PROSTATECTOMY      HX UROLOGICAL  5/3/2013    prostate biopsy    HX UROLOGICAL  12/14    radical prostatectomy   [place] November      Dr Maria Fernanda Sharp     Current Outpatient Medications   Medication Sig Dispense Refill    amphetamine-dextroamphetamine XR (ADDERALL XR) 20 mg XR capsule Take 1 Cap by mouth every morning. Max Daily Amount: 20 mg. 90 Cap 0    naproxen sodium (ALEVE) 220 mg tablet Take 220 mg by mouth two (2) times daily (with meals).  multivitamin (ONE A DAY) tablet Take 1 Tab by mouth daily.        No Known Allergies  Social History     Socioeconomic History    Marital status:      Spouse name: Not on file    Number of children: Not on file    Years of education: Not on file    Highest education level: Not on file   Tobacco Use    Smoking status: Former Smoker    Smokeless tobacco: Never Used    Tobacco comment: has never smoked cigarettes    Substance and Sexual Activity    Alcohol use: Yes     Comment: occasianally    Drug use: No    Sexual activity: Yes     Partners: Female     Visit Vitals  /72 (BP 1 Location: Left arm, BP Patient Position: Sitting)   Pulse 91   Temp 98.6 °F (37 °C) (Oral)   Resp 12   Ht 5' 8\" (1.727 m)   Wt 225 lb 12.8 oz (102.4 kg)   SpO2 99%   BMI 34.33 kg/m²     Ear canals and tympanic membranes appear normal with good light reflex bilaterally. Oral cavity reveals no lesions. Carotids are 2+ without bruits. Lungs are clear to percussion. Good breath sounds with no wheezing or crackles. Heart reveals a regular rhythm with normal S1 and S2 no murmur gallop click or rub. Apical impulse is not palpable. Abdomen is soft nontender with no hepatospleno megaly or masses and no bruits. Extremities reveal no clubbing cyanosis or edema. Pulses are 2+. The feet are a little dry and flaky but no redness in between the toes looks normal.    Results for orders placed or performed in visit on 06/24/19   LIPID PANEL   Result Value Ref Range    Triglyceride 90 40 - 149 mg/dL    HDL Cholesterol 36 (L) 40 - 59 mg/dL    Cholesterol, total 136 110 - 200 mg/dL    CHOLESTEROL/HDL 3.8 0.0 - 5.0    LDL, calculated 82 50 - 99 mg/dL    VLDL, calculated 18 8 - 30 mg/dL   METABOLIC PANEL, COMPREHENSIVE   Result Value Ref Range    Glucose 112 (H) 70 - 99 mg/dL    BUN 17 6 - 22 mg/dL    Creatinine 0.8 0.5 - 1.2 mg/dL    Sodium 146 (H) 133 - 145 mmol/L    Potassium 4.4 3.5 - 5.5 mmol/L    Chloride 106 98 - 110 mmol/L    CO2 21 20 - 32 mmol/L    AST (SGOT) 27 10 - 37 U/L    ALT (SGPT) 24 5 - 40 U/L    Alk.  phosphatase 60 25 - 115 U/L    Bilirubin, total 0.3 0.2 - 1.2 mg/dL    Calcium 9.1 8.4 - 10.4 mg/dL    Protein, total 6.9 6.4 - 8.3 g/dL    Albumin 4.4 3.5 - 5.0 g/dL    A-G Ratio 1.8 1.1 - 2.6 ratio    Globulin 2.5 2.0 - 4.0 g/dL    Anion gap 19.0 mmol/L    GFRAA >60.0 >60.0    GFRNA >60.0 >60.0   PSA SCREENING (SCREENING)   Result Value Ref Range    Prostate Specific Ag <0.014 <=3.100 ng/mL   CBC WITH AUTOMATED DIFF   Result Value Ref Range    WBC 6.9 4.0 - 11.0 K/uL    RBC 5.35 3.80 - 5.80 M/uL    HGB 15.2 13.1 - 17.2 g/dL    HCT 47.9 39.3 - 51.6 %    MCV 90 80 - 95 fL    MCH 28 26 - 34 pg    MCHC 32 31 - 36 g/dL    RDW 16.0 (H) 10.0 - 15.5 %    PLATELET 627 342 - 941 K/uL    MPV 12.0 9.0 - 13.0 fL    NEUTROPHILS 65 40 - 75 %    Lymphocytes 25 20 - 45 %    MONOCYTES 8 3 - 12 %    EOSINOPHILS 2 0 - 6 %    BASOPHILS 1 0 - 2 %    ABS. NEUTROPHILS 4.4 1.8 - 7.7 K/uL    ABSOLUTE LYMPHOCYTE COUNT 1.7 1.0 - 4.8 K/uL    ABS. MONOCYTES 0.5 0.1 - 1.0 K/uL    ABS. EOSINOPHILS 0.1 0.0 - 0.5 K/uL    ABS. BASOPHILS 0.0 0.0 - 0.2 K/uL     Assessment: #1.  Obesity down 11 pounds in 6 months, he understands importance of weight loss and is going to continue working on weight loss. #2.  History of prostate cancer treated in August 2013, no evidence of recurrence. #3.  ADD doing well, he will continue Adderall XR 20 mg daily. #4.  Impaired fasting glucose slightly better, he is going to continue working on weight loss and avoidance of excessive sweets and white starches. Follow-up 6 months or sooner if needed, no lab for a year. Isaiah Lanza MD FACP    Please note: This document has been produced using voice recognition software. Unrecognized errors in transcription may be present.

## 2019-11-11 ENCOUNTER — TELEPHONE (OUTPATIENT)
Dept: INTERNAL MEDICINE CLINIC | Age: 59
End: 2019-11-11

## 2019-11-11 DIAGNOSIS — F90.0 ATTENTION DEFICIT HYPERACTIVITY DISORDER (ADHD), PREDOMINANTLY INATTENTIVE TYPE: ICD-10-CM

## 2019-11-11 DIAGNOSIS — Z02.89 MEDICATION MANAGEMENT CONTRACT AGREEMENT: Primary | ICD-10-CM

## 2019-11-11 RX ORDER — DEXTROAMPHETAMINE SACCHARATE, AMPHETAMINE ASPARTATE MONOHYDRATE, DEXTROAMPHETAMINE SULFATE AND AMPHETAMINE SULFATE 5; 5; 5; 5 MG/1; MG/1; MG/1; MG/1
20 CAPSULE, EXTENDED RELEASE ORAL
Qty: 90 CAP | Refills: 0 | Status: SHIPPED | OUTPATIENT
Start: 2019-11-11 | End: 2020-01-02 | Stop reason: SDUPTHER

## 2019-11-11 NOTE — TELEPHONE ENCOUNTER
Last Visit: 7/1/19 with MD Lizzie Diaz  Next Appointment: 1/2/20 with MD Lizzie Diaz  Previous Refill Encounter(s): 7/1/19 #90    Requested Prescriptions     Pending Prescriptions Disp Refills    amphetamine-dextroamphetamine XR (ADDERALL XR) 20 mg XR capsule 90 Cap 0     Sig: Take 1 Cap by mouth every morning. Max Daily Amount: 20 mg.

## 2019-11-13 ENCOUNTER — APPOINTMENT (OUTPATIENT)
Dept: INTERNAL MEDICINE CLINIC | Age: 59
End: 2019-11-13

## 2019-11-18 DIAGNOSIS — Z02.89 MEDICATION MANAGEMENT CONTRACT AGREEMENT: ICD-10-CM

## 2019-11-19 LAB
AMPHETAMINE, 737686: POSITIVE
BARBITURATES: NEGATIVE NG/ML
BENZODIAZEPINES, R9BE1T: NEGATIVE NG/ML
CANNABINOID, DR86L: NEGATIVE NG/ML
COCAINE/METABOLITES, MDS2T: NEGATIVE NG/ML
CREATININE URINE,3297223: 231 MG/DL (ref 20–300)
FENTANYL, PMD99: NEGATIVE PG/ML
MEPERIDINE, 761055: NEGATIVE NG/ML
METHADONE, 791633: NEGATIVE NG/ML
OPIATE, DR88L: NEGATIVE NG/ML
OXYCODONE/OXYMORPHONE-PM: NEGATIVE NG/ML
PH UR STRIP: 5.7 [PH] (ref 4.5–8.9)
PHENCYCLIDINE: NEGATIVE NG/ML
PLEASE NOTE, TRIC1T: ABNORMAL
PROPOXYPHENE, 791635: NEGATIVE NG/ML
SPECIFIC GRAVITY, 790387: 1.02
TRAMADOL, 711021: NEGATIVE NG/ML

## 2020-01-02 ENCOUNTER — OFFICE VISIT (OUTPATIENT)
Dept: INTERNAL MEDICINE CLINIC | Age: 60
End: 2020-01-02

## 2020-01-02 VITALS
BODY MASS INDEX: 36.01 KG/M2 | TEMPERATURE: 98.6 F | DIASTOLIC BLOOD PRESSURE: 80 MMHG | HEART RATE: 72 BPM | SYSTOLIC BLOOD PRESSURE: 126 MMHG | RESPIRATION RATE: 12 BRPM | OXYGEN SATURATION: 97 % | WEIGHT: 237.6 LBS | HEIGHT: 68 IN

## 2020-01-02 DIAGNOSIS — R73.01 IMPAIRED FASTING GLUCOSE: ICD-10-CM

## 2020-01-02 DIAGNOSIS — F90.0 ATTENTION DEFICIT HYPERACTIVITY DISORDER (ADHD), PREDOMINANTLY INATTENTIVE TYPE: ICD-10-CM

## 2020-01-02 DIAGNOSIS — Z85.46 HISTORY OF PROSTATE CANCER: ICD-10-CM

## 2020-01-02 DIAGNOSIS — E66.9 OBESITY (BMI 30-39.9): Primary | ICD-10-CM

## 2020-01-02 DIAGNOSIS — Z00.00 ROUTINE GENERAL MEDICAL EXAMINATION AT A HEALTH CARE FACILITY: ICD-10-CM

## 2020-01-02 RX ORDER — DEXTROAMPHETAMINE SACCHARATE, AMPHETAMINE ASPARTATE MONOHYDRATE, DEXTROAMPHETAMINE SULFATE AND AMPHETAMINE SULFATE 5; 5; 5; 5 MG/1; MG/1; MG/1; MG/1
20 CAPSULE, EXTENDED RELEASE ORAL
Qty: 90 CAP | Refills: 0 | Status: SHIPPED | OUTPATIENT
Start: 2020-01-02 | End: 2020-02-11 | Stop reason: SDUPTHER

## 2020-01-02 NOTE — PROGRESS NOTES
Africa Bonifacio 1960, is a 64 y.o. male, who is seen today for Reevaluation of ADD, obesity, history of prostate cancer. He feels well but he is gained 12 pounds in the last 6 months, most of that over the holidays lately. He is feeling well with Adderall and functions well with the help of the medicine, he is able to keep on task and get everything done in a timely fashion. No side effects. Past Medical History:   Diagnosis Date    ADHD (attention deficit hyperactivity disorder)     Arthritis     FHx: prostate cancer     Heart burn     Hiatal hernia     Current Outpatient Medications   Medication Sig Dispense Refill    amphetamine-dextroamphetamine XR (ADDERALL XR) 20 mg XR capsule Take 1 Cap by mouth every morning. Max Daily Amount: 20 mg. 90 Cap 0    naproxen sodium (ALEVE) 220 mg tablet Take 220 mg by mouth two (2) times daily (with meals).  multivitamin (ONE A DAY) tablet Take 1 Tab by mouth daily. Visit Vitals  /80 (BP 1 Location: Left arm, BP Patient Position: Sitting)   Pulse 72   Temp 98.6 °F (37 °C)   Resp 12   Ht 5' 8\" (1.727 m)   Wt 237 lb 9.6 oz (107.8 kg)   SpO2 97%   BMI 36.13 kg/m²     Carotids are 2+ without bruits. Lungs are clear to percussion. Good breath sounds with no wheezing or crackles. Heart reveals a regular rhythm with normal S1 and S2 no murmur apical impulse is not palpable. Abdomen is soft and nontender with no hepatosplenomegaly or masses and no bruits. Extremities reveal no clubbing cyanosis or edema. Pulses are 2+. Assessment: #1. ADD doing well, he will continue Adderall XR 20 mg daily. #2.  Obesity up 12 more pounds, I encouraged him to work much harder on weight loss in the coming months. He plans to do so. #3. History of prostate cancer, we will check PSA in 6 months. Follow-up in 6 months for complete evaluation or sooner if needed    Isaiah Palacios MD FACP    Please note:   This document has been produced using voice recognition software. Unrecognized errors in transcription may be present. This visit lasted 25 minutes, greater than 50% of the time spent counseling on weight control, much less time on the other issues above.

## 2020-02-11 DIAGNOSIS — F90.0 ATTENTION DEFICIT HYPERACTIVITY DISORDER (ADHD), PREDOMINANTLY INATTENTIVE TYPE: ICD-10-CM

## 2020-02-11 RX ORDER — DEXTROAMPHETAMINE SACCHARATE, AMPHETAMINE ASPARTATE MONOHYDRATE, DEXTROAMPHETAMINE SULFATE AND AMPHETAMINE SULFATE 5; 5; 5; 5 MG/1; MG/1; MG/1; MG/1
20 CAPSULE, EXTENDED RELEASE ORAL
Qty: 90 CAP | Refills: 0 | Status: SHIPPED | OUTPATIENT
Start: 2020-02-11 | End: 2020-05-05 | Stop reason: SDUPTHER

## 2020-02-11 NOTE — TELEPHONE ENCOUNTER
Rx pended to e-scribe to [facility]. Please sign if appropriate. Requested Prescriptions     Pending Prescriptions Disp Refills    amphetamine-dextroamphetamine XR (ADDERALL XR) 20 mg XR capsule 90 Cap 0     Sig: Take 1 Cap by mouth every morning. Max Daily Amount: 20 mg.

## 2020-02-11 NOTE — TELEPHONE ENCOUNTER
Pt says he was told yesterday the rx was written last month. Says she doesn't remember ever getting it and he only has 1 pill left  Wants to know if we can reprint the rx.     Says he fills it at [facility] on [address]

## 2020-03-12 ENCOUNTER — TELEPHONE (OUTPATIENT)
Dept: INTERNAL MEDICINE CLINIC | Age: 60
End: 2020-03-12

## 2020-03-12 NOTE — TELEPHONE ENCOUNTER
1. Pt dropped off forms for RM to fill out saying pt is physically able to go [place] for a [place] experience. 2. Pt wants to know if his shots are up to date? He will travel to [place]. Gave him [facility] and [employer] travel clinic info to check on shots needed for that area. 3. Pt needs to be sure his tdap is up to date for a new grand baby coming.

## 2020-03-13 NOTE — TELEPHONE ENCOUNTER
Called the patient back about immunizations and forms being filled out by Dr Sanford Fine. Patient is aware that Dr. Sanford Fine is out of the office, and patient will wait for the forms to be filled. The patient will ask one of the ladies in the front office to print out his shot record when he picks up the signed forms. Patient agrees to calling the pharmacy for needed immunizations for the out of town trip.

## 2020-05-05 DIAGNOSIS — F90.0 ATTENTION DEFICIT HYPERACTIVITY DISORDER (ADHD), PREDOMINANTLY INATTENTIVE TYPE: ICD-10-CM

## 2020-05-05 RX ORDER — DEXTROAMPHETAMINE SACCHARATE, AMPHETAMINE ASPARTATE MONOHYDRATE, DEXTROAMPHETAMINE SULFATE AND AMPHETAMINE SULFATE 5; 5; 5; 5 MG/1; MG/1; MG/1; MG/1
20 CAPSULE, EXTENDED RELEASE ORAL
Qty: 90 CAP | Refills: 0 | Status: SHIPPED | OUTPATIENT
Start: 2020-05-05 | End: 2020-07-01 | Stop reason: SDUPTHER

## 2020-05-05 NOTE — TELEPHONE ENCOUNTER
Last Visit: 1/2/20 with MD Joanne Puentes  Next Appointment: 7/1/20 with MD Joanne Puentes  Previous Refill Encounter(s): 2/11/20 #90    Requested Prescriptions     Pending Prescriptions Disp Refills    amphetamine-dextroamphetamine XR (Adderall XR) 20 mg XR capsule 90 Cap 0     Sig: Take 1 Cap by mouth every morning. Max Daily Amount: 20 mg.

## 2020-06-24 ENCOUNTER — APPOINTMENT (OUTPATIENT)
Dept: INTERNAL MEDICINE CLINIC | Age: 60
End: 2020-06-24

## 2020-06-24 DIAGNOSIS — Z85.46 HISTORY OF PROSTATE CANCER: ICD-10-CM

## 2020-06-24 DIAGNOSIS — Z00.00 ROUTINE GENERAL MEDICAL EXAMINATION AT A HEALTH CARE FACILITY: ICD-10-CM

## 2020-06-24 LAB
ABSOLUTE LYMPHOCYTE COUNT, 10803: 1.9 K/UL (ref 1–4.8)
BASOPHILS # BLD: 0.1 K/UL (ref 0–0.2)
BASOPHILS NFR BLD: 1 % (ref 0–2)
EOSINOPHIL # BLD: 0.1 K/UL (ref 0–0.5)
EOSINOPHIL NFR BLD: 1 % (ref 0–6)
ERYTHROCYTE [DISTWIDTH] IN BLOOD BY AUTOMATED COUNT: 15.5 % (ref 10–15.5)
GRANULOCYTES,GRANS: 63 % (ref 40–75)
HCT VFR BLD AUTO: 49.5 % (ref 39.3–51.6)
HGB BLD-MCNC: 15.4 G/DL (ref 13.1–17.2)
LYMPHOCYTES, LYMLT: 25 % (ref 20–45)
MCH RBC QN AUTO: 28 PG (ref 26–34)
MCHC RBC AUTO-ENTMCNC: 31 G/DL (ref 31–36)
MCV RBC AUTO: 91 FL (ref 80–95)
MONOCYTES # BLD: 0.7 K/UL (ref 0.1–1)
MONOCYTES NFR BLD: 9 % (ref 3–12)
NEUTROPHILS # BLD AUTO: 4.8 K/UL (ref 1.8–7.7)
PLATELET # BLD AUTO: 246 K/UL (ref 140–440)
PMV BLD AUTO: 11.7 FL (ref 9–13)
RBC # BLD AUTO: 5.47 M/UL (ref 3.8–5.8)
WBC # BLD AUTO: 7.6 K/UL (ref 4–11)

## 2020-06-25 LAB
A-G RATIO,AGRAT: 2 RATIO (ref 1.1–2.6)
ALBUMIN SERPL-MCNC: 4.5 G/DL (ref 3.5–5)
ALP SERPL-CCNC: 61 U/L (ref 25–115)
ALT SERPL-CCNC: 22 U/L (ref 5–40)
ANION GAP SERPL CALC-SCNC: 14 MMOL/L
AST SERPL W P-5'-P-CCNC: 23 U/L (ref 10–37)
BILIRUB SERPL-MCNC: 0.3 MG/DL (ref 0.2–1.2)
BUN SERPL-MCNC: 14 MG/DL (ref 6–22)
CALCIUM SERPL-MCNC: 9.6 MG/DL (ref 8.4–10.5)
CHLORIDE SERPL-SCNC: 103 MMOL/L (ref 98–110)
CHOLEST SERPL-MCNC: 153 MG/DL (ref 110–200)
CO2 SERPL-SCNC: 25 MMOL/L (ref 20–32)
CREAT SERPL-MCNC: 0.8 MG/DL (ref 0.5–1.2)
GFRAA, 66117: >60
GFRNA, 66118: >60
GLOBULIN,GLOB: 2.2 G/DL (ref 2–4)
GLUCOSE SERPL-MCNC: 106 MG/DL (ref 70–99)
HDLC SERPL-MCNC: 36 MG/DL
HDLC SERPL-MCNC: 4.3 MG/DL (ref 0–5)
LDL/HDL RATIO,LDHD: 2.4
LDLC SERPL CALC-MCNC: 89 MG/DL (ref 50–99)
NON-HDL CHOLESTEROL, 011976: 117 MG/DL
POTASSIUM SERPL-SCNC: 4.8 MMOL/L (ref 3.5–5.5)
PROT SERPL-MCNC: 6.7 G/DL (ref 6.4–8.3)
PSA SERPL-MCNC: <0.014 NG/ML
SODIUM SERPL-SCNC: 142 MMOL/L (ref 133–145)
TRIGL SERPL-MCNC: 138 MG/DL (ref 40–149)
VLDLC SERPL CALC-MCNC: 28 MG/DL (ref 8–30)

## 2020-07-01 ENCOUNTER — OFFICE VISIT (OUTPATIENT)
Dept: INTERNAL MEDICINE CLINIC | Age: 60
End: 2020-07-01

## 2020-07-01 VITALS
WEIGHT: 234.8 LBS | HEART RATE: 87 BPM | HEIGHT: 68 IN | OXYGEN SATURATION: 98 % | TEMPERATURE: 98.5 F | DIASTOLIC BLOOD PRESSURE: 80 MMHG | RESPIRATION RATE: 12 BRPM | BODY MASS INDEX: 35.58 KG/M2 | SYSTOLIC BLOOD PRESSURE: 120 MMHG

## 2020-07-01 DIAGNOSIS — F90.0 ATTENTION DEFICIT HYPERACTIVITY DISORDER (ADHD), PREDOMINANTLY INATTENTIVE TYPE: ICD-10-CM

## 2020-07-01 DIAGNOSIS — Z00.00 ROUTINE GENERAL MEDICAL EXAMINATION AT A HEALTH CARE FACILITY: Primary | ICD-10-CM

## 2020-07-01 DIAGNOSIS — E66.9 OBESITY (BMI 30-39.9): ICD-10-CM

## 2020-07-01 DIAGNOSIS — R73.01 IMPAIRED FASTING GLUCOSE: ICD-10-CM

## 2020-07-01 DIAGNOSIS — Z85.46 HISTORY OF PROSTATE CANCER: ICD-10-CM

## 2020-07-01 RX ORDER — DEXTROAMPHETAMINE SACCHARATE, AMPHETAMINE ASPARTATE MONOHYDRATE, DEXTROAMPHETAMINE SULFATE AND AMPHETAMINE SULFATE 5; 5; 5; 5 MG/1; MG/1; MG/1; MG/1
20 CAPSULE, EXTENDED RELEASE ORAL
Qty: 90 CAP | Refills: 0 | Status: SHIPPED | OUTPATIENT
Start: 2020-07-01 | End: 2020-11-03 | Stop reason: SDUPTHER

## 2020-07-01 NOTE — PROGRESS NOTES
Garrett Bermudez 1960, is a 64 y.o. male, who is seen today for a routine physical exam and follow-up on ADD, impaired fasting glucose, obesity and prostate cancer. He feels great. He is taking good care of himself but has not been eating any better, he has gained over 10 pounds when I saw him in January and he has not lost the weight so he is at the same weight he was 2 years ago but up 9 pounds from this time last year despite urging him to keep his weight down particularly in view of impaired fasting glucose. He had radical prostatectomy either in 2013 or 2014 and PSA has been undetectable since then. He has ADD and is doing very well on Adderall, it helps him focus concentrate and get through his workday much much better.h    Past Surgical History:   Procedure Laterality Date    HX COLONOSCOPY  8/8/14    diverticuli,  10 years    HX PROSTATECTOMY      HX UROLOGICAL  5/3/2013    prostate biopsy    HX UROLOGICAL  12/14    radical prostatectomy   Kristin Kwok Blood     Current Outpatient Medications   Medication Sig Dispense Refill    amphetamine-dextroamphetamine XR (Adderall XR) 20 mg XR capsule Take 1 Cap by mouth every morning. Max Daily Amount: 20 mg. 90 Cap 0    naproxen sodium (ALEVE) 220 mg tablet Take 220 mg by mouth two (2) times daily (with meals).  multivitamin (ONE A DAY) tablet Take 1 Tab by mouth daily.        No Known Allergies  Social History     Socioeconomic History    Marital status:      Spouse name: Not on file    Number of children: Not on file    Years of education: Not on file    Highest education level: Not on file   Tobacco Use    Smoking status: Former Smoker    Smokeless tobacco: Never Used    Tobacco comment: has never smoked cigarettes    Substance and Sexual Activity    Alcohol use: Yes     Comment: occasianally    Drug use: No    Sexual activity: Yes     Partners: Female     Visit Vitals  /80 (BP 1 Location: Right arm, BP Patient Position: Sitting)   Pulse 87   Temp 98.5 °F (36.9 °C) (Oral)   Resp 12   Ht 5' 8\" (1.727 m)   Wt 234 lb 12.8 oz (106.5 kg)   SpO2 98%   BMI 35.70 kg/m²     Carotids are 2+ no bruits. Neck reveals no adenopathy or thyromegaly. Lungs are clear to percussion. Good breath sounds with no wheezing or crackles. Heart reveals a regular rhythm with normal S1 and S2 no murmur gallop click or rub. Apical impulse is not palpable. Abdomen is soft and nontender with no hepatosplenomegaly or masses and no bruits. Extremities reveal no clubbing cyanosis or edema. Pulses are 2+. Results for orders placed or performed in visit on 06/24/20   PSA, DIAGNOSTIC (PROSTATE SPECIFIC AG)   Result Value Ref Range    Prostate Specific Ag <0.014 <=1.070 ng/mL   METABOLIC PANEL, COMPREHENSIVE   Result Value Ref Range    Glucose 106 (H) 70 - 99 mg/dL    BUN 14 6 - 22 mg/dL    Creatinine 0.8 0.5 - 1.2 mg/dL    Sodium 142 133 - 145 mmol/L    Potassium 4.8 3.5 - 5.5 mmol/L    Chloride 103 98 - 110 mmol/L    CO2 25 20 - 32 mmol/L    AST (SGOT) 23 10 - 37 U/L    ALT (SGPT) 22 5 - 40 U/L    Alk.  phosphatase 61 25 - 115 U/L    Bilirubin, total 0.3 0.2 - 1.2 mg/dL    Calcium 9.6 8.4 - 10.5 mg/dL    Protein, total 6.7 6.4 - 8.3 g/dL    Albumin 4.5 3.5 - 5.0 g/dL    A-G Ratio 2.0 1.1 - 2.6 ratio    Globulin 2.2 2.0 - 4.0 g/dL    Anion gap 14.0 mmol/L    GFRAA >60.0 >60.0    GFRNA >60.0 >60.0   LIPID PANEL   Result Value Ref Range    Triglyceride 138 40 - 149 mg/dL    HDL Cholesterol 36 (L) >=40 mg/dL    Cholesterol, total 153 110 - 200 mg/dL    CHOLESTEROL/HDL 4.3 0.0 - 5.0    Non-HDL Cholesterol 117 <130 mg/dL    LDL, calculated 89 50 - 99 mg/dL    VLDL, calculated 28 8 - 30 mg/dL    LDL/HDL Ratio 2.4    CBC WITH AUTOMATED DIFF   Result Value Ref Range    WBC 7.6 4.0 - 11.0 K/uL    RBC 5.47 3.80 - 5.80 M/uL    HGB 15.4 13.1 - 17.2 g/dL    HCT 49.5 39.3 - 51.6 %    MCV 91 80 - 95 fL    MCH 28 26 - 34 pg    MCHC 31 31 - 36 g/dL    RDW 15.5 10.0 - 15.5 %    PLATELET 479 264 - 480 K/uL    MPV 11.7 9.0 - 13.0 fL    NEUTROPHILS 63 40 - 75 %    Lymphocytes 25 20 - 45 %    MONOCYTES 9 3 - 12 %    EOSINOPHILS 1 0 - 6 %    BASOPHILS 1 0 - 2 %    ABS. NEUTROPHILS 4.8 1.8 - 7.7 K/uL    ABSOLUTE LYMPHOCYTE COUNT 1.9 1.0 - 4.8 K/uL    ABS. MONOCYTES 0.7 0.1 - 1.0 K/uL    ABS. EOSINOPHILS 0.1 0.0 - 0.5 K/uL    ABS. BASOPHILS 0.1 0.0 - 0.2 K/uL       Assessment: #1. ADD doing well, he will continue Adderall XR 20 mg daily. #2.  History of prostate cancer status post radical prostatectomy 6 or 7 years ago, continues to have undetectable PSA. #3.  Impaired fasting glucose actually little better than it was the last couple of years, but I have advised him to avoid sweets in his diet for the most part and try to keep his weight down to decrease his risk of eventually developing diabetes. #4.  Obesity as above, unchanged from 2 years ago, he will be working on weight loss. Follow-up in about 6 months with Dr. Brigitte Lucero. Jessica Craig MD FACP    Please note: This document has been produced using voice recognition software. Unrecognized errors in transcription may be present. Treva Tiffanie pm

## 2020-07-24 ENCOUNTER — HOSPITAL ENCOUNTER (OUTPATIENT)
Dept: CT IMAGING | Age: 60
Discharge: HOME | End: 2020-07-24
Attending: PHYSICIAN ASSISTANT
Payer: COMMERCIAL

## 2020-07-24 DIAGNOSIS — R31.0 GROSS HEMATURIA: ICD-10-CM

## 2020-07-24 PROCEDURE — 74011636320 HC RX REV CODE- 636/320: Performed by: PHYSICIAN ASSISTANT

## 2020-07-24 PROCEDURE — 74178 CT ABD&PLV WO CNTR FLWD CNTR: CPT

## 2020-07-24 RX ADMIN — IOPAMIDOL 100 ML: 755 INJECTION, SOLUTION INTRAVENOUS at 16:47

## 2020-08-26 PROBLEM — C67.9 MALIGNANT NEOPLASM OF URINARY BLADDER (HCC): Status: ACTIVE | Noted: 2020-08-21

## 2020-08-26 PROBLEM — M18.10 PRIMARY OSTEOARTHRITIS OF FIRST CARPOMETACARPAL JOINT: Status: ACTIVE | Noted: 2018-09-11

## 2020-08-26 PROBLEM — N32.89 BLADDER MASS: Status: ACTIVE | Noted: 2020-08-17

## 2020-11-03 DIAGNOSIS — F90.0 ATTENTION DEFICIT HYPERACTIVITY DISORDER (ADHD), PREDOMINANTLY INATTENTIVE TYPE: ICD-10-CM

## 2020-11-03 RX ORDER — DEXTROAMPHETAMINE SACCHARATE, AMPHETAMINE ASPARTATE MONOHYDRATE, DEXTROAMPHETAMINE SULFATE AND AMPHETAMINE SULFATE 5; 5; 5; 5 MG/1; MG/1; MG/1; MG/1
20 CAPSULE, EXTENDED RELEASE ORAL
Qty: 90 CAP | Refills: 0 | Status: SHIPPED | OUTPATIENT
Start: 2020-11-03 | End: 2021-03-31 | Stop reason: SDUPTHER

## 2020-11-03 NOTE — TELEPHONE ENCOUNTER
CSA: 11/11/2019    Last visit 07/01/2020 MD Adi Weeks   Next appointment 01/04/2021 [facility]   Previous refill encounter(s)   07/01/2020 Adderall XR #90     Requested Prescriptions     Pending Prescriptions Disp Refills    amphetamine-dextroamphetamine XR (Adderall XR) 20 mg XR capsule 90 Cap 0     Sig: Take 1 Cap by mouth every morning. Max Daily Amount: 20 mg.

## 2021-01-04 ENCOUNTER — OFFICE VISIT (OUTPATIENT)
Dept: INTERNAL MEDICINE CLINIC | Age: 61
End: 2021-01-04
Payer: COMMERCIAL

## 2021-01-04 DIAGNOSIS — Z79.899 LONG-TERM CURRENT USE OF STIMULANT: ICD-10-CM

## 2021-01-04 DIAGNOSIS — F90.0 ATTENTION DEFICIT HYPERACTIVITY DISORDER (ADHD), PREDOMINANTLY INATTENTIVE TYPE: ICD-10-CM

## 2021-01-04 DIAGNOSIS — R73.09 ELEVATED RANDOM BLOOD GLUCOSE LEVEL: ICD-10-CM

## 2021-01-04 DIAGNOSIS — Z23 NEEDS FLU SHOT: ICD-10-CM

## 2021-01-04 DIAGNOSIS — Z76.89 ENCOUNTER TO ESTABLISH CARE WITH NEW DOCTOR: Primary | ICD-10-CM

## 2021-01-04 DIAGNOSIS — Z79.899 MEDICATION MANAGEMENT: ICD-10-CM

## 2021-01-04 DIAGNOSIS — Z85.51 HISTORY OF BLADDER CANCER: ICD-10-CM

## 2021-01-04 PROCEDURE — 90471 IMMUNIZATION ADMIN: CPT | Performed by: NURSE PRACTITIONER

## 2021-01-04 PROCEDURE — 99215 OFFICE O/P EST HI 40 MIN: CPT | Performed by: NURSE PRACTITIONER

## 2021-01-04 PROCEDURE — 90686 IIV4 VACC NO PRSV 0.5 ML IM: CPT | Performed by: NURSE PRACTITIONER

## 2021-01-04 RX ORDER — DEXTROAMPHETAMINE SACCHARATE, AMPHETAMINE ASPARTATE MONOHYDRATE, DEXTROAMPHETAMINE SULFATE AND AMPHETAMINE SULFATE 5; 5; 5; 5 MG/1; MG/1; MG/1; MG/1
20 CAPSULE, EXTENDED RELEASE ORAL
Qty: 60 CAP | Refills: 0 | Status: SHIPPED | OUTPATIENT
Start: 2021-02-02 | End: 2021-03-31 | Stop reason: SDUPTHER

## 2021-01-04 NOTE — PROGRESS NOTES
[facility] [place] [address] [address] [facility], [address]   [phone] HEFXK/[phone] fax      1/4/2021    HPI:   Nikki Wesley 1960 is a pleasant WHITE OR  male who presents today to establish care with new provider and for routine physical exam.  Patient is  with 2 adult children. He works as a  at [employer] 9 through 12. His spouse works at [employer] as a schoolteacher. His daughter is also a  at [employer]. He has 1 doggarcia retriever. Bladder cancer: Diagnosed June 2020. He underwent 6 BCG treatments after surgery. He was scoped 3 weeks ago and is completing his third treatment of BCG. He states he will be scoped again in a few months and as long as the scope is normal he will receive again another 3 treatments of BCG. He will be scoped again a few months later and as long as this is normal they will go to once a year scoping with no further treatments. Patient had a prostatectomy 7 years ago. Patient sees Dr. Marisabel Hanley at [facility] [place]. ADD: Patient has been taking Adderall 20 mg daily for the past 5 to 7 years for his ADD. He uses this for concentration at work and at home. He is tolerating this therapy well. He denies any side effects or complications. He is seeking a refill today. Shoulder pains: Approximately 2 weeks ago patient started suffering from shoulder pains, right more than the left. He is now seeing Dr. Jamar Campos, chiropractor, 2-3 times a week until he is adjusted and then he will go to monthly therapy after that. He states his therapy is doing well. Current medications: Adderall 20 mg daily (patient is requesting 90-day supply at a time), Motrin 200 mg as needed, Aleve 220 mg as needed, probiotics daily, Fiberchoice daily, multivitamin once a day.     Past Medical History:   Diagnosis Date    ADHD (attention deficit hyperactivity disorder)     Arthritis     FHx: prostate cancer     Heart burn     Hiatal hernia    History of prostate cancer 12/2014     Past Surgical History:   Procedure Laterality Date    HX COLONOSCOPY  8/8/14    diverticuli,  10 years    HX PROSTATECTOMY  12/2014    radical prostatectomy    HX UROLOGICAL  5/3/2013    prostate biopsy    HX VASECTOMY      Dr Suzanne Eugene     Current Outpatient Medications   Medication Sig    [START ON 2/2/2021] amphetamine-dextroamphetamine XR (ADDERALL XR) 20 mg XR capsule Take 1 Cap by mouth every morning for 60 days. Max Daily Amount: 20 mg. Indications: attention deficit disorder with hyperactivity    amphetamine-dextroamphetamine XR (Adderall XR) 20 mg XR capsule Take 1 Cap by mouth every morning. Max Daily Amount: 20 mg.    ibuprofen (MOTRIN) 200 mg tablet Take 200 mg by mouth every six (6) hours as needed.  B.infantis-B.ani-B.long-B.bifi (Probiotic 4X) 10-15 mg TbEC Take  by mouth.  FIBER CHOICE PO Take  by mouth.  naproxen sodium (ALEVE) 220 mg tablet Take 220 mg by mouth two (2) times daily (with meals).  multivitamin (ONE A DAY) tablet Take 1 Tab by mouth daily. No current facility-administered medications for this visit. Allergies and Intolerances:   No Known Allergies  Family History:   Family History   Problem Relation Age of Onset    Cancer Father         prostate cancer in his late 62s, carlos well into his 76s    Stroke Mother      Social History:   He  reports that he has quit smoking. He has never used smokeless tobacco.   Social History     Substance and Sexual Activity   Alcohol Use Yes    Comment: occasianally     Immunization History:  Immunization History   Administered Date(s) Administered    Influenza Vaccine Interse) PF (>6 Mo Flulaval, Fluarix, and >3 Yrs [place], Fluzone [address]97[address]) 01/04/2021    TD Vaccine 07/03/2007    Tdap 07/24/2015       Review of Systems:   As above included in HPI.   Otherwise 11 point review of systems negative including constitutional, skin, HENT, eyes, respiratory, cardiovascular, gastrointestinal, genitourinary, musculoskeletal, endocrine, hematologic, allergy, and neurologic. Physical:   Visit Vitals  /86   Pulse (!) 103   Temp 98.1 °F (36.7 °C) (Temporal)   Resp 16   Ht 5' 8\" (1.727 m)   Wt 245 lb 12.8 oz (111.5 kg)   SpO2 97%   BMI 37.37 kg/m²      Wt Readings from Last 3 Encounters:   01/04/21 245 lb 12.8 oz (111.5 kg)   12/30/20 239 lb (108.4 kg)   12/23/20 239 lb (108.4 kg)       Exam:   Physical Exam  Vitals signs reviewed. Constitutional:       Appearance: Normal appearance. He is obese. HENT:      Head: Normocephalic and atraumatic. Right Ear: Tympanic membrane, ear canal and external ear normal.      Left Ear: Tympanic membrane, ear canal and external ear normal.      Mouth/Throat:      Mouth: Mucous membranes are moist.   Eyes:      Extraocular Movements: Extraocular movements intact. Neck:      Musculoskeletal: Normal range of motion. Cardiovascular:      Rate and Rhythm: Normal rate and regular rhythm. Pulses: Normal pulses. Heart sounds: Normal heart sounds. Comments: No edema noted to leta LEs  Pulmonary:      Effort: Pulmonary effort is normal. No respiratory distress. Breath sounds: Normal breath sounds. Abdominal:      General: Abdomen is flat. Bowel sounds are normal.      Palpations: Abdomen is soft. Musculoskeletal: Normal range of motion. Skin:     General: Skin is warm and dry. Neurological:      Mental Status: He is alert and oriented to person, place, and time.    Psychiatric:         Mood and Affect: Mood normal.         Behavior: Behavior normal.          Review of Data:  Lab review: N/A    Impression:  Patient Active Problem List   Diagnosis Code    FHx: prostate cancer Z80.42    Heart burn R12    Attention deficit hyperactivity disorder (ADHD), predominantly inattentive type F90.0    Impaired fasting glucose R73.01    Obesity (BMI 30-39. 9) E66.9    History of prostate cancer Z85.46    Bladder mass N32.89    Primary osteoarthritis of first carpometacarpal joint M18.10    Malignant neoplasm of urinary bladder (HCC) C67.9       Plan:  1. Encounter to establish care with new doctor  Patient is transferring to me from their previous provider who retired, Dr. Adriano Shukla. I spent no less than 45 minutes reviewing and updating the chart to include previous labs, diagnostics, and specialty notes. 2. Attention deficit hyperactivity disorder (ADHD), predominantly inattentive type  Patient has been on Adderall 20 mg for an extended period of time and has tolerated his treatments well. Will not refer to psychiatry for management of his ADD at this time due to patient managing well on the same 20 mg dose for many years. If this dose becomes ineffective, will then refer patient to psychiatry. No change in therapy at this time. Will obtain a UDS and substance control contract. I did explain to him the contract in detail and the consequences if he goes outside of that contract. Patient verbalized understanding.    - PAIN MGMT PANEL, URINE W/RFLX CONFIRM; Future  - amphetamine-dextroamphetamine XR (ADDERALL XR) 20 mg XR capsule; Take 1 Cap by mouth every morning for 60 days. Max Daily Amount: 20 mg. Indications: attention deficit disorder with hyperactivity  Dispense: 60 Cap; Refill: 0  - PAIN MGMT PANEL, URINE W/RFLX CONFIRM    3. Long-term current use of stimulant      4. Elevated random blood glucose level  Due to patient's noted elevated glucose levels, will reassess in 6 months via CMP. 5. Needs flu shot    - INFLUENZA VIRUS VAC QUAD,SPLIT,PRESV FREE SYRINGE IM    6. History of bladder cancer  Patient continues to follow-up with his urologist every 3 months. 7. Medication management  Reviewed medication and completed the medication reconciliation with the patient. Reviewed side effects of medications with the patient.  Questions were answered and patient verb understanding. Follow up 3 months VIRTUAL  Labs needed 1 week prior to appt: No      Dr. Jenna Amezcua, AGNP-C, DNP  [facility] 5:   40 minutes with the patient on counseling, answering questions, and/or coordination of care. Complex medical review of medical history, lab results, and testing. Complicated management plan formulated.

## 2021-01-04 NOTE — LETTER
Name:Shanique Arias :1960 MR #:[id number] Provider Name:Leilani Luis DNP *[id number]* [id number] () Page 1 of 5 Initial [employer] CONTROLLED SUBSTANCE AGREEMENT I may be prescribed medications that are controlled substances as part  of my treatment plan for management of my medical condition(s). The goal of my treatment plan is to maintain and/or improve my health and wellbeing. Because controlled substances have an increased risk of abuse or harm, continual re-evaluation is needed determine if the goals of my treatment plan are being met for my safety and the safety of others. Driss Shah  am entering into this Controlled Substance Agreement with my provider, Darnell Vázquez DNP at [place] [address] . I understand that successful treatment requires mutual trust and honesty between me and my provider. I understand that there are state and federal laws and regulations which apply to the medications that my provider may prescribe that must be followed. I understand there are risks and benefits ts of taking the medicines that my provider may prescribe. I understand and agree that following this Agreement is necessary in continuing my provider-patient relationship and success of my treatment plan. As a part of my treatment plan, I agree to the following: COMMUNICATION: 
 
1. I will communicate fully with my provider about my medical condition(s), including the effect on my daily life and how well my medications are helping. I will tell my provider all of the medications that I take for any reason, including medications I receive from another health care provider, and will notify my provider about all issues, problems or concerns, including any side effects, which may be related to my medications. I understand that this information allows my provider to adjust my treatment plan to help manage my medical condition. I understand that this information will become part of my permanent medical record. 2. I will notify my provider if I have a history of alcohol/drug misuse/addiction or if I have had treatment for alcohol/drug addiction in the past, or if I have a new problem with or concern about alcohol/drug use/addiction, because this increases the likelihood of high risk behaviors and may lead to serious medical conditions. 3. Females Only: I will notify my provider if I am or become pregnant, or if I intend to become pregnant, or if I intend to breastfeed. I understand that communication of these issues with my provider is important, due to possible effects my medication could have on an unborn fetus or breastfeeding child. Name:.Paul D Lynnea Buerger :1960 MR #:[id number] Provider Name:Cris Luis, DNP *[id number]* [id number] () Page 2 of 5 Initial [employer] MISUSE OF MEDICATIONS / DRUGS: 
 
1. I agree to take all controlled substances as prescribed, and will not misuse or abuse any controlled substances prescribed by my provider. For my safety, I will not increase the amount of medicine I take without first talking with and getting permission from my provider. 2. If I have a medical emergency, another health care provider may prescribe me medication. If I seek emergency treatment, I will notify my provider within seventy-two (72) hours. 3. I understand that my provider may discuss my use and/or possible misuse/abuse of controlled substances and alcohol, as appropriate, with any health care provider involved in my care, pharmacist or legal authority. ILLEGAL DRUGS: 
 
1. I will not use illegal drugs of any kind, including but not limited to marijuana, heroin, cocaine, or any prescription drug which is not prescribed to me. DRUG DIVERSION / PRESCRIPTION FRAUD: 
 
1. I will not share, sell, trade, give away, or otherwise misuse my prescriptions or medications. 2. I will not alter any prescriptions provided to me by my provider. SINGLE PROVIDER: 
 
1. I agree that all controlled substances that I take will be prescribed only by my provider (or his/her covering provider) under this Agreement. This agreement does not prevent me from seeking emergency medical treatment or receiving pain management related to a surgery. PROTECTING MEDICATIONS: 
 
1. I am responsible for keeping my prescriptions and medications in a safe and secure place including safeguarding them from loss or theft. I understand that lost, stolen or damaged/destroyed prescriptions or medications will not be replaced. Name:.William Hurtado :1960 MR #:[id number] Provider Name:Lizeth Luis DNP *[id number]* [id number] () Page 3 of 5 Initial Quarterly PRESCRIPTION RENEWALS/REFILLS: 
 
 
1. I authorize my provider and my pharmacy to cooperate fully with any local, state, or federal law enforcement agency in the investigation of any possible misuse, sale, or other diversion of my controlled substance prescriptions or medications. RISKS: 
 
 
1. I understand that if I do not adhere to this Agreement in any way, my provider may change my prescriptions, stop prescribing controlled substances or end our provider-patient relationship. 2. If my provider decides to stop prescribing medication, or decides to end our provider-patient relationship,my provider may require that I taper my medications slowly. If necessary, my provider may also provide a prescription for other medications to treat my withdrawal symptoms. UNDERSTANDING THIS AGREEMENT: 
 
I understand that my provider may adjust or stop my prescriptions for controlled substances based on my medical condition and my treatment plan. I understand that this Agreement does not guarantee that I will be prescribed medications or controlled substances. I understand that controlled substances may be just one part 
of my treatment plan. My initial on each page and my signature below shows that I have read each page of this Agreement, I have had an opportunity to ask questions, and all of my questions have been answered to my satisfaction by my provider. By signing below, I agree to comply with this Agreement, and I understand that if I do not follow the Agreements listed above, my provider may stop 
 
 
 
_________________________________________  Date/Time 1/4/2021 3:48 PM   
             (Patient Signature)

## 2021-01-04 NOTE — PROGRESS NOTES
Chief Complaint   [facility]       1. Have you been to the ER, urgent care clinic since your last visit? Hospitalized since your last visit? Yes, [facility] on 8/17/2020 (cytoscopy)    2. Have you seen or consulted any other health care providers outside of the [address] [place] [address] since your last visit? Include any pap smears or colon screening.  Yes 8/26/2020, [facility] [place] dx bladder cancer

## 2021-01-04 NOTE — PATIENT INSTRUCTIONS
Vaccine Information Statement Influenza (Flu) Vaccine (Inactivated or Recombinant): What You Need to Know Many Vaccine Information Statements are available in [place]men and other languages. See www.immunize.org/vis Hojas de información sobre vacunas están disponibles en español y en muchos otros idiomas. Visite www.immunize.org/vis 1. Why get vaccinated? Influenza vaccine can prevent influenza (flu). Flu is a contagious disease that spreads around the [place] [facility] every year, usually between October and May. Anyone can get the flu, but it is more dangerous for some people. Infants and young children, people 72years of age and older, pregnant women, and people with certain health conditions or a weakened immune system are at greatest risk of flu complications. Pneumonia, bronchitis, sinus infections and ear infections are examples of flu-related complications. If you have a medical condition, such as heart disease, cancer or diabetes, flu can make it worse. Flu can cause fever and chills, sore throat, muscle aches, fatigue, cough, headache, and runny or stuffy nose. Some people may have vomiting and diarrhea, though this is more common in children than adults. Each year thousands of people in the [facility] die from flu, and many more are hospitalized. Flu vaccine prevents millions of illnesses and flu-related visits to the doctor each year. 2. Influenza vaccines CDC recommends everyone 10months of age and older get vaccinated every flu season. Children 6 months through 6years of age may need 2 doses during a single flu season. Everyone else needs only 1 dose each flu season. It takes about 2 weeks for protection to develop after vaccination. There are many flu viruses, and they are always changing. Each year a new flu vaccine is made to protect against three or four viruses that are likely to cause disease in the upcoming flu season. Even when the vaccine doesnt exactly match these viruses, it may still provide some protection. Influenza vaccine does not cause flu. Influenza vaccine may be given at the same time as other vaccines. 3. Talk with your health care provider Tell your vaccine provider if the person getting the vaccine: 
 Has had an allergic reaction after a previous dose of influenza vaccine, or has any severe, life-threatening allergies.  Has ever had Guillain-Barré Syndrome (also called GBS). In some cases, your health care provider may decide to postpone influenza vaccination to a future visit. People with minor illnesses, such as a cold, may be vaccinated. People who are moderately or severely ill should usually wait until they recover before getting influenza vaccine. Your health care provider can give you more information. 4. Risks of a reaction  Soreness, redness, and swelling where shot is given, fever, muscle aches, and headache can happen after influenza vaccine.  There may be a very small increased risk of Guillain-Barré Syndrome (GBS) after inactivated influenza vaccine (the flu shot). Venyeny Elisa children who get the flu shot along with pneumococcal vaccine (PCV13), and/or DTaP vaccine at the same time might be slightly more likely to have a seizure caused by fever. Tell your health care provider if a child who is getting flu vaccine has ever had a seizure. People sometimes faint after medical procedures, including vaccination. Tell your provider if you feel dizzy or have vision changes or ringing in the ears. As with any medicine, there is a very remote chance of a vaccine causing a severe allergic reaction, other serious injury, or death. 5. What if there is a serious problem? An allergic reaction could occur after the vaccinated person leaves the clinic. If you see signs of a severe allergic reaction (hives, swelling of the face and throat, difficulty breathing, a fast heartbeat, dizziness, or weakness), call 9-1-1 and get the person to the nearest hospital. 
 
For other signs that concern you, call your health care provider. Adverse reactions should be reported to the Vaccine Adverse Event Reporting System (VAERS). Your health care provider will usually file this report, or you can do it yourself. Visit the VAERS website at www.vaers. [facility].gov or call 2-521.724.1365. VAERS is only for reporting reactions, and VAERS staff do not give medical advice. 6. The National Vaccine Injury Compensation Program 
 
The [facility] Vaccine Injury Compensation Program (VICP) is a federal program that was created to compensate people who may have been injured by certain vaccines. Visit the VICP website at www.hrsa.gov/vaccinecompensation or call 4-677.810.5573 to learn about the program and about filing a claim. There is a time limit to file a claim for compensation. 7. How can I learn more?  Ask your health care provider.  Call your local or state health department.  Contact the Centers for Disease Control and Prevention (CDC): 
- Call 1-276.958.2269 (1-800-CDC-INFO) or 
- Visit CDCs influenza website at www.cdc.gov/flu Vaccine Information Statement (Interim) Inactivated Influenza Vaccine 8/15/2019 
42 FRAN Busch [id number]-[id number] Department of [facility] and [employer] Centers for Disease Control and Prevention Office Use Only

## 2021-01-04 NOTE — PROGRESS NOTES
Loura Day 1960 male who presents for routine immunizations. Patient denies any symptoms , reactions or allergies that would exclude them from being immunized today. Risks and adverse reactions were discussed and the VIS was given to them. All questions were addressed. Order placed for flu vaccine,  per Verbal Order from [facility] with read back. Patient was observed for 15 min post injection. There were no reactions observed.     Brayan Gordon LPN

## 2021-01-05 VITALS
DIASTOLIC BLOOD PRESSURE: 86 MMHG | TEMPERATURE: 98.1 F | BODY MASS INDEX: 37.25 KG/M2 | WEIGHT: 245.8 LBS | HEIGHT: 68 IN | RESPIRATION RATE: 16 BRPM | OXYGEN SATURATION: 97 % | SYSTOLIC BLOOD PRESSURE: 137 MMHG | HEART RATE: 103 BPM

## 2021-01-07 LAB
AMPHETAMINE, 737686: POSITIVE NG/ML
BARBITURATES: NEGATIVE NG/ML
BENZODIAZEPINES, R9BE1T: NEGATIVE NG/ML
BUPRENORPHINE, URINE: NEGATIVE NG/ML
CANNABINOID, DR86L: NEGATIVE NG/ML
COCAINE/METABOLITES, MDS2T: NEGATIVE NG/ML
CREATININE URINE,3297223: 145.9 MG/DL (ref 20–300)
METHADONE, 791633: NEGATIVE NG/ML
OPIATE, DR88L: NEGATIVE NG/ML
OXYCODONE/OXYMORPHONE-PM: NEGATIVE NG/ML
PH UR STRIP: 5.5 [PH] (ref 4.5–8.9)
PHENCYCLIDINE: NEGATIVE NG/ML
PLEASE NOTE, 62788: ABNORMAL
PROPOXYPHENE, 791635: NEGATIVE NG/ML

## 2021-03-31 ENCOUNTER — VIRTUAL VISIT (OUTPATIENT)
Dept: INTERNAL MEDICINE CLINIC | Age: 61
End: 2021-03-31
Payer: COMMERCIAL

## 2021-03-31 DIAGNOSIS — F90.0 ATTENTION DEFICIT HYPERACTIVITY DISORDER (ADHD), PREDOMINANTLY INATTENTIVE TYPE: ICD-10-CM

## 2021-03-31 PROCEDURE — 99213 OFFICE O/P EST LOW 20 MIN: CPT | Performed by: NURSE PRACTITIONER

## 2021-03-31 RX ORDER — DEXTROAMPHETAMINE SACCHARATE, AMPHETAMINE ASPARTATE MONOHYDRATE, DEXTROAMPHETAMINE SULFATE AND AMPHETAMINE SULFATE 5; 5; 5; 5 MG/1; MG/1; MG/1; MG/1
20 CAPSULE, EXTENDED RELEASE ORAL
Qty: 90 CAP | Refills: 0 | Status: SHIPPED
Start: 2021-06-28 | End: 2021-06-02

## 2021-03-31 RX ORDER — DEXTROAMPHETAMINE SACCHARATE, AMPHETAMINE ASPARTATE MONOHYDRATE, DEXTROAMPHETAMINE SULFATE AND AMPHETAMINE SULFATE 5; 5; 5; 5 MG/1; MG/1; MG/1; MG/1
20 CAPSULE, EXTENDED RELEASE ORAL
Qty: 90 CAP | Refills: 0 | Status: SHIPPED | OUTPATIENT
Start: 2021-03-31 | End: 2021-06-29

## 2021-03-31 NOTE — PROGRESS NOTES
Subjective    Don Stokes presents virtually today for ADD/ADHD follow up. Patient is doing well with current medication. Reports good focus and concentration as well as task completion. Denies heart palpitations, dizziness, jitteriness, ticks or twitches, or lightheadedness. Denies significant change in appetite. Weight is stable. Sleeping well-approx 7 hours per night. Current Outpatient Medications on File Prior to Visit   Medication Sig Dispense Refill    [DISCONTINUED] amphetamine-dextroamphetamine XR (ADDERALL XR) 20 mg XR capsule Take 1 Cap by mouth every morning for 60 days. Max Daily Amount: 20 mg. Indications: attention deficit disorder with hyperactivity 60 Cap 0    [DISCONTINUED] amphetamine-dextroamphetamine XR (Adderall XR) 20 mg XR capsule Take 1 Cap by mouth every morning. Max Daily Amount: 20 mg. 90 Cap 0    ibuprofen (MOTRIN) 200 mg tablet Take 200 mg by mouth every six (6) hours as needed.  B.infantis-B.ani-B.long-B.bifi (Probiotic 4X) 10-15 mg TbEC Take  by mouth.  FIBER CHOICE PO Take  by mouth.  naproxen sodium (ALEVE) 220 mg tablet Take 220 mg by mouth two (2) times daily (with meals).  multivitamin (ONE A DAY) tablet Take 1 Tab by mouth daily. No current facility-administered medications on file prior to visit. ROS:  Constitutional: Neg.  ENT- Neg. Pulmonary- Neg. Cardiac- Neg. Gastrointestinal- Neg. Genitourinary- Neg. Musculoskeletal- Neg. Neurologic- Neg. Derm. - Neg. Physical:   Exam:   Vital Signs: (As obtained by patient/caregiver at home)  There were not vitals taken for this visit.      PHYSICAL EXAMINATION:  [ INSTRUCTIONS:  \"[x]\" Indicates a positive item  \"[]\" Indicates a negative item  -- DELETE ALL ITEMS NOT EXAMINED]      Constitutional: [x] Appears well-developed and well-nourished [x] No apparent distress      [] Abnormal - moderately obese    Mental status: [x] Alert and awake  [x] Oriented to person/place/time [x] Able to follow commands    [] Abnormal -     Eyes:   EOM    [x]  Normal    [] Abnormal -   Sclera  [x]  Normal    [] Abnormal -          Discharge [x]  None visible   [] Abnormal -     HENT, Neck: [x] Normocephalic, atraumatic  [] Abnormal - . [x] Mouth/Throat: Mucous membranes are moist    Pulmonary/Chest: [x] Respiratory effort normal   [x] No visualized signs of difficulty breathing or respiratory distress        [] Abnormal -      Musculoskeletal:   [x] Normal gait with no signs of ataxia         [x] Normal range of motion of neck        [] Abnormal -     Neurological:        [x] No Facial Asymmetry (Cranial nerve 7 motor function) (limited exam due to video visit)          [x] No gaze palsy        [] Abnormal -          Skin:        [x] No significant exanthematous lesions or discoloration noted on facial skin         [] Abnormal -            Psychiatric:       [x] Normal Affect [] Abnormal -        [x] No Hallucinations    Diagnosis:    ICD-10-CM ICD-9-CM    1. Attention deficit hyperactivity disorder (ADHD), predominantly inattentive type  F90.0 314.00 amphetamine-dextroamphetamine XR (Adderall XR) 20 mg XR capsule      amphetamine-dextroamphetamine XR (ADDERALL XR) 20 mg XR capsule         Plan:   No worrisome findings of frequent need for increasing dose, 'loss of prescription', etc. I have reviewed this patient's controlled substance prescription history through the Prescription Monitoring Program. No inconsistencies noted. Dispensing history is appropriate and congruent with patient reported history. Last UDS and Substance agreement obtained 1/2021. Patient to follow-up in 6 months at a face-to-face appointment.     Dr Mynor Varghese, AGNP-C, DNP  [facility] [address] [place] [address]

## 2021-07-07 DIAGNOSIS — F90.0 ATTENTION DEFICIT HYPERACTIVITY DISORDER (ADHD), PREDOMINANTLY INATTENTIVE TYPE: Primary | ICD-10-CM

## 2021-07-07 RX ORDER — DEXTROAMPHETAMINE SACCHARATE, AMPHETAMINE ASPARTATE MONOHYDRATE, DEXTROAMPHETAMINE SULFATE AND AMPHETAMINE SULFATE 5; 5; 5; 5 MG/1; MG/1; MG/1; MG/1
20 CAPSULE, EXTENDED RELEASE ORAL
Qty: 90 CAPSULE | Refills: 0 | Status: SHIPPED | OUTPATIENT
Start: 2021-07-07 | End: 2021-09-27 | Stop reason: SDUPTHER

## 2021-07-07 NOTE — TELEPHONE ENCOUNTER
UDS done 1/4/21  CSA signed 1/4/21    Last Visit: 3/31/21 with APRIL Luis  Next Appointment: 9/27/21 with APRIL Luis  Previous Refill Encounter(s): 3/31/21 #90    Requested Prescriptions     Pending Prescriptions Disp Refills    amphetamine-dextroamphetamine XR (ADDERALL XR) 20 mg XR capsule 90 Capsule 0     Sig: Take 1 Capsule by mouth every morning. Max Daily Amount: 20 mg.

## 2021-09-27 ENCOUNTER — OFFICE VISIT (OUTPATIENT)
Dept: INTERNAL MEDICINE CLINIC | Age: 61
End: 2021-09-27
Payer: COMMERCIAL

## 2021-09-27 VITALS
OXYGEN SATURATION: 98 % | RESPIRATION RATE: 18 BRPM | HEIGHT: 69 IN | WEIGHT: 227.2 LBS | BODY MASS INDEX: 33.65 KG/M2 | TEMPERATURE: 97.2 F | DIASTOLIC BLOOD PRESSURE: 84 MMHG | HEART RATE: 70 BPM | SYSTOLIC BLOOD PRESSURE: 139 MMHG

## 2021-09-27 DIAGNOSIS — F90.0 ATTENTION DEFICIT HYPERACTIVITY DISORDER (ADHD), PREDOMINANTLY INATTENTIVE TYPE: ICD-10-CM

## 2021-09-27 DIAGNOSIS — Z00.00 ROUTINE GENERAL MEDICAL EXAMINATION AT A HEALTH CARE FACILITY: ICD-10-CM

## 2021-09-27 DIAGNOSIS — R73.09 ELEVATED GLUCOSE LEVEL: Primary | ICD-10-CM

## 2021-09-27 PROCEDURE — 99213 OFFICE O/P EST LOW 20 MIN: CPT | Performed by: NURSE PRACTITIONER

## 2021-09-27 RX ORDER — DEXTROAMPHETAMINE SACCHARATE, AMPHETAMINE ASPARTATE MONOHYDRATE, DEXTROAMPHETAMINE SULFATE AND AMPHETAMINE SULFATE 5; 5; 5; 5 MG/1; MG/1; MG/1; MG/1
20 CAPSULE, EXTENDED RELEASE ORAL
Qty: 90 CAPSULE | Refills: 0 | Status: SHIPPED | OUTPATIENT
Start: 2021-12-26 | End: 2021-12-21 | Stop reason: SDUPTHER

## 2021-09-27 RX ORDER — DEXTROAMPHETAMINE SACCHARATE, AMPHETAMINE ASPARTATE MONOHYDRATE, DEXTROAMPHETAMINE SULFATE AND AMPHETAMINE SULFATE 5; 5; 5; 5 MG/1; MG/1; MG/1; MG/1
20 CAPSULE, EXTENDED RELEASE ORAL
Qty: 90 CAPSULE | Refills: 0 | Status: SHIPPED
Start: 2021-09-27 | End: 2021-10-21

## 2021-09-27 NOTE — PROGRESS NOTES
Kilo Hassan presents for ADD/ADHD follow up. Patient is doing well with current medication. Reports good focus and concentration as well as task completion. 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 Denies heart palpitations, dizziness, jitteriness, ticks or twitches, or lightheadedness. Denies significant change in appetite. Weight is stable. Sleeping 7-8 hours per night. Current Outpatient Medications on File Prior to Visit   Medication Sig Dispense Refill    ascorbic acid, vitamin C, (Vitamin C) 250 mg tablet Take  by mouth.  B.infantis-B.ani-B.long-B.bifi (Probiotic 4X) 10-15 mg TbEC Take  by mouth.  FIBER CHOICE PO Take  by mouth.  multivitamin (ONE A DAY) tablet Take 1 Tab by mouth daily.  [DISCONTINUED] amphetamine-dextroamphetamine XR (ADDERALL XR) 20 mg XR capsule Take 1 Capsule by mouth every morning. Max Daily Amount: 20 mg. 90 Capsule 0     No current facility-administered medications on file prior to visit. ROS:  Constitutional: Neg.  ENT- Neg. Pulmonary- Neg. Cardiac- Neg. Gastrointestinal- Neg. Genitourinary- Neg. Musculoskeletal- Neg. Neurologic- Neg. Derm. - Neg. Visit Vitals  /84   Pulse 70   Temp 97.2 °F (36.2 °C) (Temporal)   Resp 18   Ht 5' 8.5\" (1.74 m)   Wt 227 lb 3.2 oz (103.1 kg)   SpO2 98%   BMI 34.04 kg/m²       PHYSICAL EXAMINATION:  Physical Exam  Vitals and nursing note reviewed. Constitutional:       Appearance: Normal appearance. He is obese. HENT:      Head: Normocephalic and atraumatic. Right Ear: External ear normal.      Left Ear: External ear normal.   Eyes:      Extraocular Movements: Extraocular movements intact. Conjunctiva/sclera: Conjunctivae normal.   Neck:      Vascular: No carotid bruit. Cardiovascular:      Rate and Rhythm: Normal rate and regular rhythm. Pulses: Normal pulses. Heart sounds: Normal heart sounds.       Comments: No edema noted  Pulmonary: Effort: Pulmonary effort is normal. No respiratory distress. Breath sounds: Normal breath sounds. No wheezing. Musculoskeletal:         General: Normal range of motion. Cervical back: Normal range of motion and neck supple. Comments: Gait stable   Skin:     General: Skin is warm and dry. Neurological:      General: No focal deficit present. Mental Status: He is alert and oriented to person, place, and time. Psychiatric:         Mood and Affect: Mood normal.         Behavior: Behavior normal.         Thought Content: Thought content normal.         Judgment: Judgment normal.         Diagnosis:    ICD-10-CM ICD-9-CM    1. Elevated glucose level  R73.09 790.29 HEMOGLOBIN A1C WITH EAG   2. Attention deficit hyperactivity disorder (ADHD), predominantly inattentive type  F90.0 314.00 amphetamine-dextroamphetamine XR (ADDERALL XR) 20 mg XR capsule      amphetamine-dextroamphetamine XR (ADDERALL XR) 20 mg XR capsule      PAIN MGMT PANEL, URINE W/RFLX CONFIRM   3. Routine general medical examination at a health care facility  Z00.00 V70.0 LIPID PANEL      HEMOGLOBIN A1C WITH EAG      METABOLIC PANEL, COMPREHENSIVE         Plan:   No worrisome findings of frequent need for increasing dose, 'loss of prescription', etc. Reviewed  Aware. Dispensing history is appropriate and congruent with patient reported history.       Follow up in 6 months for physical  Labs 1 week prior to appt    Dr Alex Bennett, KENP-C, OPAL  [facility]ist of [facility]

## 2021-09-27 NOTE — PROGRESS NOTES
Chief Complaint   Patient presents with    Behavioral Problem     6 mo f/u       1. Have you been to the ER, urgent care clinic since your last visit? Hospitalized since your last visit? No    2. Have you seen or consulted any other health care providers outside of the [address] [place] [address] since your last visit? Include any pap smears or colon screening.  No

## 2021-09-27 NOTE — PROGRESS NOTES
Kilo White presents for ADD/ADHD follow up. Patient is doing well with current medication. Reports good focus and concentration as well as task completion. Denies heart palpitations, dizziness, jitteriness, ticks or twitches, or lightheadedness. Denies significant change in appetite. Weight is stable. Sleeping ***. [unfilled]    ROS:  Constitutional: Neg.  ENT- Neg. Pulmonary- Neg. Cardiac- Neg. Gastrointestinal- Neg. Genitourinary- Neg. Musculoskeletal- Neg. Neurologic- Neg. Derm. - Neg. Physical:   Exam:   Vital Signs: (As obtained by patient/caregiver at home)  There {WERE; WERE PGN:[id number]} vitals taken for this visit. PHYSICAL EXAMINATION:  [ INSTRUCTIONS:  \"[x]\" Indicates a positive item  \"[]\" Indicates a negative item  -- DELETE ALL ITEMS NOT EXAMINED]      Constitutional: [x] Appears well-developed and well-nourished [x] No apparent distress      [] Abnormal - {general (Optional):53020}    Mental status: [x] Alert and awake  [x] Oriented to person/place/time [x] Able to follow commands    [] Abnormal -     Eyes:   EOM    [x]  Normal    [] Abnormal -   Sclera  [x]  Normal    [] Abnormal -          Discharge [x]  None visible   [] Abnormal - {eye exam abnormal - (Optional):09324}    HENT, Neck: [x] Normocephalic, atraumatic  [] Abnormal - {right/left:28853}.   [x] Mouth/Throat: Mucous membranes are moist    Pulmonary/Chest: [x] Respiratory effort normal   [x] No visualized signs of difficulty breathing or respiratory distress        [] Abnormal - {pulm exam obs abnormal (Optional):61588}     Musculoskeletal:   [x] Normal gait with no signs of ataxia         [x] Normal range of motion of neck        [] Abnormal - {extremities abnormal (Optional):36668}    Neurological:        [x] No Facial Asymmetry (Cranial nerve 7 motor function) (limited exam due to video visit)          [x] No gaze palsy        [] Abnormal - {neuro exam abnormal (Optional):27004} {cranial nerves abnormal (Optional):90633}        Skin:        [x] No significant exanthematous lesions or discoloration noted on facial skin         [] Abnormal - {skin exam  abnormal (Optional):16384}           Psychiatric:       [x] Normal Affect [] Abnormal - {mental status exam abnormal (Optional):47841}       [x] No Hallucinations    Diagnosis:      {No Diagnosis Found}      Plan:   No worrisome findings of frequent need for increasing dose, 'loss of prescription', etc. Reviewed  Aware. Dispensing history is appropriate and congruent with patient reported history. There are no Patient Instructions on file for this visit. {LSPT CONTINUE:35525} current medication    [unfilled]    Follow up in 3 months.

## 2021-12-21 DIAGNOSIS — F90.0 ATTENTION DEFICIT HYPERACTIVITY DISORDER (ADHD), PREDOMINANTLY INATTENTIVE TYPE: ICD-10-CM

## 2021-12-21 RX ORDER — DEXTROAMPHETAMINE SACCHARATE, AMPHETAMINE ASPARTATE MONOHYDRATE, DEXTROAMPHETAMINE SULFATE AND AMPHETAMINE SULFATE 5; 5; 5; 5 MG/1; MG/1; MG/1; MG/1
20 CAPSULE, EXTENDED RELEASE ORAL
Qty: 90 CAPSULE | Refills: 0 | Status: SHIPPED | OUTPATIENT
Start: 2021-12-26 | End: 2022-03-28 | Stop reason: SDUPTHER

## 2021-12-21 NOTE — TELEPHONE ENCOUNTER
Patient called and left message stating that he took his last tablet of Adderall XR today. Stated that the pharmacy did not have entire amount(about 70) which is why he is out today. Last visit:3/31/21  Next visit:3/21/22  Previous refill 9/27/21(90+0R)    Requested Prescriptions     Pending Prescriptions Disp Refills    amphetamine-dextroamphetamine XR (ADDERALL XR) 20 mg XR capsule 90 Capsule 0     Sig: Take 1 Capsule by mouth every morning. Max Daily Amount: 20 mg. Please review  before prescribing new script for this medication.      Thanks,  [facility]

## 2022-03-01 DIAGNOSIS — R73.09 ELEVATED GLUCOSE LEVEL: ICD-10-CM

## 2022-03-01 DIAGNOSIS — F90.0 ATTENTION DEFICIT HYPERACTIVITY DISORDER (ADHD), PREDOMINANTLY INATTENTIVE TYPE: ICD-10-CM

## 2022-03-01 DIAGNOSIS — Z00.00 ROUTINE GENERAL MEDICAL EXAMINATION AT A HEALTH CARE FACILITY: ICD-10-CM

## 2022-03-18 PROBLEM — R73.01 IMPAIRED FASTING GLUCOSE: Status: ACTIVE | Noted: 2017-12-26

## 2022-03-19 PROBLEM — N32.89 BLADDER MASS: Status: ACTIVE | Noted: 2020-08-17

## 2022-03-19 PROBLEM — Z85.46 HISTORY OF PROSTATE CANCER: Status: ACTIVE | Noted: 2019-07-01

## 2022-03-19 PROBLEM — C67.9 MALIGNANT NEOPLASM OF URINARY BLADDER (HCC): Status: ACTIVE | Noted: 2020-08-21

## 2022-03-20 PROBLEM — M18.10 PRIMARY OSTEOARTHRITIS OF FIRST CARPOMETACARPAL JOINT: Status: ACTIVE | Noted: 2018-09-11

## 2022-03-20 PROBLEM — E66.9 OBESITY (BMI 30-39.9): Status: ACTIVE | Noted: 2019-07-01

## 2022-03-21 ENCOUNTER — APPOINTMENT (OUTPATIENT)
Dept: INTERNAL MEDICINE CLINIC | Age: 62
End: 2022-03-21

## 2022-03-22 LAB
A-G RATIO,AGRAT: 1.6 RATIO (ref 1.1–2.6)
ALBUMIN SERPL-MCNC: 4.1 G/DL (ref 3.5–5)
ALP SERPL-CCNC: 59 U/L (ref 25–115)
ALT SERPL-CCNC: 24 U/L (ref 5–40)
ANION GAP SERPL CALC-SCNC: 10 MMOL/L (ref 3–15)
AST SERPL W P-5'-P-CCNC: 21 U/L (ref 10–37)
AVG GLU, 10930: 120 MG/DL (ref 91–123)
BILIRUB SERPL-MCNC: 0.3 MG/DL (ref 0.2–1.2)
BUN SERPL-MCNC: 13 MG/DL (ref 6–22)
CALCIUM SERPL-MCNC: 9.6 MG/DL (ref 8.4–10.5)
CHLORIDE SERPL-SCNC: 103 MMOL/L (ref 98–110)
CHOLEST SERPL-MCNC: 133 MG/DL (ref 110–200)
CO2 SERPL-SCNC: 26 MMOL/L (ref 20–32)
CREAT SERPL-MCNC: 0.9 MG/DL (ref 0.5–1.2)
GFRAA, 66117: >60
GFRNA, 66118: >60
GLOBULIN,GLOB: 2.6 G/DL (ref 2–4)
GLUCOSE SERPL-MCNC: 89 MG/DL (ref 70–99)
HBA1C MFR BLD HPLC: 5.8 % (ref 4.8–5.6)
HDLC SERPL-MCNC: 3.7 MG/DL (ref 0–5)
HDLC SERPL-MCNC: 36 MG/DL
LDL/HDL RATIO,LDHD: 2.2
LDLC SERPL CALC-MCNC: 80 MG/DL (ref 50–99)
NON-HDL CHOLESTEROL, 011976: 97 MG/DL
POTASSIUM SERPL-SCNC: 4 MMOL/L (ref 3.5–5.5)
PROT SERPL-MCNC: 6.7 G/DL (ref 6.4–8.3)
SODIUM SERPL-SCNC: 139 MMOL/L (ref 133–145)
TRIGL SERPL-MCNC: 84 MG/DL (ref 40–149)
VLDLC SERPL CALC-MCNC: 17 MG/DL (ref 8–30)

## 2022-03-23 LAB
AMPHETAMINE, 737686: POSITIVE NG/ML
BARBITURATES: NEGATIVE NG/ML
BENZODIAZEPINES, R9BE1T: NEGATIVE NG/ML
BUPRENORPHINE, URINE: NEGATIVE NG/ML
CANNABINOID, DR86L: NEGATIVE NG/ML
COCAINE/METABOLITES, MDS2T: NEGATIVE NG/ML
CREATININE URINE,3297223: 104.3 MG/DL (ref 20–300)
METHADONE, 791633: NEGATIVE NG/ML
OPIATE, DR88L: NEGATIVE NG/ML
OXYCODONE/OXYMORPHONE-PM: NEGATIVE NG/ML
PH UR STRIP: 5.6 [PH] (ref 4.5–8.9)
PHENCYCLIDINE: NEGATIVE NG/ML
PLEASE NOTE, 62788: ABNORMAL
PROPOXYPHENE, 791635: NEGATIVE NG/ML

## 2022-03-28 ENCOUNTER — OFFICE VISIT (OUTPATIENT)
Dept: INTERNAL MEDICINE CLINIC | Age: 62
End: 2022-03-28
Payer: COMMERCIAL

## 2022-03-28 VITALS
DIASTOLIC BLOOD PRESSURE: 80 MMHG | RESPIRATION RATE: 18 BRPM | HEIGHT: 69 IN | HEART RATE: 81 BPM | SYSTOLIC BLOOD PRESSURE: 136 MMHG | WEIGHT: 232.7 LBS | TEMPERATURE: 97.6 F | BODY MASS INDEX: 34.47 KG/M2 | OXYGEN SATURATION: 99 %

## 2022-03-28 DIAGNOSIS — Z00.00 ANNUAL PHYSICAL EXAM: Primary | ICD-10-CM

## 2022-03-28 DIAGNOSIS — F90.0 ATTENTION DEFICIT HYPERACTIVITY DISORDER (ADHD), PREDOMINANTLY INATTENTIVE TYPE: ICD-10-CM

## 2022-03-28 DIAGNOSIS — Z79.899 LONG-TERM CURRENT USE OF STIMULANT: ICD-10-CM

## 2022-03-28 DIAGNOSIS — E66.9 OBESITY (BMI 30-39.9): ICD-10-CM

## 2022-03-28 PROCEDURE — 99396 PREV VISIT EST AGE 40-64: CPT | Performed by: NURSE PRACTITIONER

## 2022-03-28 RX ORDER — DEXTROAMPHETAMINE SACCHARATE, AMPHETAMINE ASPARTATE MONOHYDRATE, DEXTROAMPHETAMINE SULFATE AND AMPHETAMINE SULFATE 5; 5; 5; 5 MG/1; MG/1; MG/1; MG/1
20 CAPSULE, EXTENDED RELEASE ORAL
Qty: 90 CAPSULE | Refills: 0 | Status: SHIPPED | OUTPATIENT
Start: 2022-06-25 | End: 2022-08-29 | Stop reason: SDUPTHER

## 2022-03-28 RX ORDER — DEXTROAMPHETAMINE SACCHARATE, AMPHETAMINE ASPARTATE MONOHYDRATE, DEXTROAMPHETAMINE SULFATE AND AMPHETAMINE SULFATE 5; 5; 5; 5 MG/1; MG/1; MG/1; MG/1
20 CAPSULE, EXTENDED RELEASE ORAL
Qty: 90 CAPSULE | Refills: 0 | Status: SHIPPED | OUTPATIENT
Start: 2022-03-28 | End: 2022-06-26

## 2022-03-28 NOTE — PROGRESS NOTES
Subjective:     Jessica Merino is a 63 y.o. male presenting for annual exam and complete physical. Patient is  with 2 adult children. He works as a  at [employer]. His spouse works at [employer] as a schoolteacher. His daughter is also a  at [employer]. He has 1 dog-garcia retriever. He is a  and like to brew his own [place]. Patient Active Problem List   Diagnosis Code    FHx: prostate cancer Z80.42    Heart burn R12    Attention deficit hyperactivity disorder (ADHD), predominantly inattentive type F90.0    Impaired fasting glucose R73.01    Obesity (BMI 30-39. 9) E66.9    History of prostate cancer Z85.46    Bladder mass N32.89    Primary osteoarthritis of first carpometacarpal joint M18.10    Malignant neoplasm of urinary bladder (HCC) C67.9     Patient Active Problem List    Diagnosis Date Noted    Malignant neoplasm of urinary bladder ([address] 08/21/2020    Bladder mass 08/17/2020    Obesity (BMI 30-39.9) 07/01/2019    History of prostate cancer 07/01/2019    Primary osteoarthritis of first carpometacarpal joint 09/11/2018    Impaired fasting glucose 12/26/2017    Attention deficit hyperactivity disorder (ADHD), predominantly inattentive type 08/10/2015    FHx: prostate cancer     Heart burn      Current Outpatient Medications   Medication Sig Dispense Refill    amphetamine-dextroamphetamine XR (ADDERALL XR) 20 mg XR capsule Take 1 Capsule by mouth every morning for 90 days. Max Daily Amount: 20 mg. Indications: attention deficit disorder with hyperactivity 90 Capsule 0    [START ON 6/25/2022] amphetamine-dextroamphetamine XR (ADDERALL XR) 20 mg XR capsule Take 1 Capsule by mouth every morning. Max Daily Amount: 20 mg. Indications: attention deficit disorder with hyperactivity 90 Capsule 0    ascorbic acid, vitamin C, (Vitamin C) 250 mg tablet Take  by mouth.       B.infantis-B.ani-B.long-B.bifi (Probiotic 4X) 10-15 mg TbEC Take  by mouth.  FIBER CHOICE PO Take  by mouth.  multivitamin (ONE A DAY) tablet Take 1 Tab by mouth daily. No Known Allergies  Past Medical History:   Diagnosis Date    ADHD (attention deficit hyperactivity disorder)     Arthritis     FHx: prostate cancer     Heart burn     Hiatal hernia    History of prostate cancer 12/2014     Past Surgical History:   Procedure Laterality Date    HX COLONOSCOPY  8/8/14    diverticuli,  10 years    HX PROSTATECTOMY  12/2014    radical prostatectomy    HX UROLOGICAL  5/3/2013    prostate biopsy    HX VASECTOMY      Dr Nilesh Whitney        Lab Results   Component Value Date/Time    Cholesterol, total 133 03/21/2022 03:11 PM    HDL Cholesterol 36 (L) 03/21/2022 03:11 PM    LDL, calculated 80 03/21/2022 03:11 PM    LDL-C, External 97 08/08/2015 01:10 AM    Triglyceride 84 03/21/2022 03:11 PM     Lab Results   Component Value Date/Time    Sodium 139 03/21/2022 03:11 PM    Potassium 4.0 03/21/2022 03:11 PM    Chloride 103 03/21/2022 03:11 PM    CO2 26 03/21/2022 03:11 PM    Anion gap 10.0 03/21/2022 03:11 PM    Glucose 89 03/21/2022 03:11 PM    BUN 13 03/21/2022 03:11 PM    Creatinine 0.9 03/21/2022 03:11 PM    BUN/Creatinine ratio 13 11/25/2013 05:30 PM    GFR est AA >60 11/25/2013 05:30 PM    GFR est non-AA >60 11/25/2013 05:30 PM    Calcium 9.6 03/21/2022 03:11 PM    Bilirubin, total 0.3 03/21/2022 03:11 PM    ALT (SGPT) 24 03/21/2022 03:11 PM    Alk. phosphatase 59 03/21/2022 03:11 PM    Protein, total 6.7 03/21/2022 03:11 PM    Albumin 4.1 03/21/2022 03:11 PM    Globulin 2.6 03/21/2022 03:11 PM    A-G Ratio 1.6 03/21/2022 03:11 PM      Lab Results   Component Value Date/Time    Hemoglobin A1c 5.8 (H) 03/21/2022 03:11 PM       UDS neg    Review of Systems  A comprehensive review of systems was negative except for that written in the HPI.     Objective:     Visit Vitals  /80   Pulse 81   Temp 97.6 °F (36.4 °C) (Temporal)   Resp 18  5' 8.5\" (1.74 m)   Wt 232 lb 11.2 oz (105.6 kg)   SpO2 99%   BMI 34.87 kg/m²     Physical exam:   General appearance - alert, well appearing, and in no distress, overweight and well hydrated  Mental status - normal mood, behavior, speech, dress, motor activity, and thought processes  Ears - bilateral TM's and external ear canals normal  Mouth - mucous membranes moist, pharynx normal without lesions  Neck - supple, no significant adenopathy, carotid bruit (-)  Chest - clear to auscultation, no wheezes, rales or rhonchi, symmetric air entry  Heart - normal rate, regular rhythm, normal S1, S2, no murmurs, rubs, clicks or gallops, S3 present, PPP bilateral  Abdomen - soft, nontender, nondistended, no masses or organomegaly  bowel sounds normal  Neurological - screening mental status exam normal, cranial nerves II through XII intact  Musculoskeletal - no joint tenderness, deformity or swelling, no muscular tenderness noted. Strength 5/5 all extremities. Gait stable. Extremities - peripheral pulses normal, no pedal edema, no clubbing or cyanosis, no pedal edema noted  Skin - normal coloration and turgor, no rashes, no suspicious skin lesions noted     Assessment/Plan:       ICD-10-CM ICD-9-CM    1. Annual physical exam  Z00.00 V70.0    2. Attention deficit hyperactivity disorder (ADHD), predominantly inattentive type  F90.0 314.00 amphetamine-dextroamphetamine XR (ADDERALL XR) 20 mg XR capsule      amphetamine-dextroamphetamine XR (ADDERALL XR) 20 mg XR capsule   3. Long-term current use of stimulant  Z79.899 V58.69    4. Obesity (BMI 30-39. 9)  E66.9 278.00      -Annual physical  Discussed recent labs    -Attention Deficit disorder  Refilled Adderall  UDS negative    -Obesity  lose weight   Avoid excessive amts of carbs and sugars in diet  increase physical activity       Dr Helio Hoskins, AGNP-C, DNP  Internist of [facility]      The total time 30 minutes.   At least 50% of that time was spent in counseling and/or coordination of care. My summary of patient counseling and coordination of care includes reviewing medical record, assessing patient, placing orders, and discussing plan of care with patient.  Prior to seeing this patient, I reviewed records, including previously completed appointments/records, reviewed specialty records in [facility], and updated visits from other providers since I saw the patient last.

## 2022-03-28 NOTE — PROGRESS NOTES
Chief Complaint   Patient presents with    Complete Physical    Labs     completed 3/2/2022     1. \"Have you been to the ER, urgent care clinic since your last visit? Hospitalized since your last visit? \" No    2. \"Have you seen or consulted any other health care providers outside of the [address] [place] [address] since your last visit? \" Yes, Urology    3. For patients aged 39-70: Has the patient had a colonoscopy / FIT/ Cologuard? Yes - no Care Gap present      If the patient is female:    4. For patients aged 41-77: Has the patient had a mammogram within the past 2 years? NA - based on age or sex      11. For patients aged 21-65: Has the patient had a pap smear?  NA - based on age or sex

## 2022-08-29 ENCOUNTER — OFFICE VISIT (OUTPATIENT)
Dept: INTERNAL MEDICINE CLINIC | Age: 62
End: 2022-08-29
Payer: COMMERCIAL

## 2022-08-29 VITALS
OXYGEN SATURATION: 97 % | WEIGHT: 232 LBS | RESPIRATION RATE: 18 BRPM | TEMPERATURE: 98.8 F | BODY MASS INDEX: 34.36 KG/M2 | DIASTOLIC BLOOD PRESSURE: 86 MMHG | HEIGHT: 69 IN | SYSTOLIC BLOOD PRESSURE: 132 MMHG | HEART RATE: 87 BPM

## 2022-08-29 DIAGNOSIS — N52.2 DRUG-INDUCED ERECTILE DYSFUNCTION: ICD-10-CM

## 2022-08-29 DIAGNOSIS — F90.0 ATTENTION DEFICIT HYPERACTIVITY DISORDER (ADHD), PREDOMINANTLY INATTENTIVE TYPE: Primary | ICD-10-CM

## 2022-08-29 DIAGNOSIS — Z79.899 LONG-TERM CURRENT USE OF STIMULANT: ICD-10-CM

## 2022-08-29 PROCEDURE — 99213 OFFICE O/P EST LOW 20 MIN: CPT | Performed by: NURSE PRACTITIONER

## 2022-08-29 RX ORDER — DEXTROAMPHETAMINE SACCHARATE, AMPHETAMINE ASPARTATE MONOHYDRATE, DEXTROAMPHETAMINE SULFATE AND AMPHETAMINE SULFATE 5; 5; 5; 5 MG/1; MG/1; MG/1; MG/1
20 CAPSULE, EXTENDED RELEASE ORAL
Qty: 90 CAPSULE | Refills: 0 | Status: SHIPPED | OUTPATIENT
Start: 2022-08-29 | End: 2022-11-27

## 2022-08-29 RX ORDER — DEXTROAMPHETAMINE SACCHARATE, AMPHETAMINE ASPARTATE MONOHYDRATE, DEXTROAMPHETAMINE SULFATE AND AMPHETAMINE SULFATE 5; 5; 5; 5 MG/1; MG/1; MG/1; MG/1
20 CAPSULE, EXTENDED RELEASE ORAL
Qty: 90 CAPSULE | Refills: 0 | Status: SHIPPED | OUTPATIENT
Start: 2022-11-27

## 2022-08-29 RX ORDER — TADALAFIL 20 MG/1
TABLET ORAL
Qty: 30 TABLET | Refills: 0 | Status: SHIPPED | OUTPATIENT
Start: 2022-08-29

## 2022-08-29 NOTE — PROGRESS NOTES
Chief Complaint   Patient presents with    Attention Deficit Disorder     6 month f/u     1. \"Have you been to the ER, urgent care clinic since your last visit? Hospitalized since your last visit? \" No    2. \"Have you seen or consulted any other health care providers outside of the [address] [place] [address] since your last visit? \" No     3. For patients aged 39-70: Has the patient had a colonoscopy / FIT/ Cologuard? Yes - Care Gap present. Most recent result on file      If the patient is female:    4. For patients aged 41-77: Has the patient had a mammogram within the past 2 years? NA - based on age or sex      11. For patients aged 21-65: Has the patient had a pap smear?  NA - based on age or sex

## 2022-08-29 NOTE — PROGRESS NOTES
[facility]  Vincent watt, [address]  [phone] [id number]/[phone] fax    8/29/2022    Ted Gallo 1960 is a pleasant [address] male. Todays concerns/HPI:  ADD. Seeking refill on med. Denies side effects of medication. Sleeps 6-8 hours per night. ED. Having issues with sustaining an erection. At times has issue with obtaining erection. Denies difficulty with urine stream or voiding. Seeking tx for ED. Past Medical History:   Diagnosis Date    ADHD (attention deficit hyperactivity disorder)     Arthritis     Bladder cancer ([facility] [address]     FHx: prostate cancer     Heart burn     Hiatal hernia    History of prostate cancer 12/2014     Current Outpatient Medications   Medication Sig    tadalafiL (Cialis) 20 mg tablet Take 1 tab by mouth every 3 days as needed. No more than 1 tab in a 24 hour period. Indications: the inability to have an erection    amphetamine-dextroamphetamine XR (ADDERALL XR) 20 mg XR capsule Take 1 Capsule by mouth every morning. Max Daily Amount: 20 mg. Indications: attention deficit disorder with hyperactivity    ascorbic acid, vitamin C, (VITAMIN C) 250 mg tablet Take  by mouth. B.infantis-B.ani-B.long-B.bifi (Probiotic 4X) 10-15 mg TbEC Take  by mouth. FIBER CHOICE PO Take  by mouth.    multivitamin (ONE A DAY) tablet Take 1 Tab by mouth daily. No current facility-administered medications for this visit. Review of Systems:  Pertinent items are noted in HPI. Physical:   Visit Vitals  BP (!) 144/93   Pulse 87   Temp 98.8 °F (37.1 °C) (Temporal)   Resp 18   Ht 5' 8.5\" (1.74 m)   Wt 232 lb (105.2 kg)   SpO2 97%   BMI 34.76 kg/m²      Wt Readings from Last 3 Encounters:   08/29/22 232 lb (105.2 kg)   04/21/22 227 lb (103 kg)   03/28/22 232 lb 11.2 oz (105.6 kg)       Exam:   Physical Exam  Constitutional:       Appearance: Normal appearance. He is obese.    HENT:      Head: Normocephalic and atraumatic. Neck:      Vascular: No carotid bruit. Cardiovascular:      Rate and Rhythm: Normal rate and regular rhythm. Pulmonary:      Effort: Pulmonary effort is normal. No respiratory distress. Breath sounds: Normal breath sounds. No wheezing. Musculoskeletal:         General: Normal range of motion. Cervical back: Neck supple. Skin:     General: Skin is warm and dry. Neurological:      Mental Status: He is alert and oriented to person, place, and time. Body mass index is 34.76 kg/m². Review of Data:  N/a    Plan:  1. Attention deficit hyperactivity disorder (ADHD), predominantly inattentive type    - amphetamine-dextroamphetamine XR (ADDERALL XR) 20 mg XR capsule; Take 1 Capsule by mouth every morning for 90 days. Max Daily Amount: 20 mg. Indications: attention deficit disorder with hyperactivity  Dispense: 90 Capsule; Refill: 0  - amphetamine-dextroamphetamine XR (ADDERALL XR) 20 mg XR capsule; Take 1 Capsule by mouth every morning. Max Daily Amount: 20 mg. Dispense: 90 Capsule; Refill: 0    2. Long-term current use of stimulant  He will complete this week. - EKG, 12 LEAD, INITIAL; Future    3. Drug-induced erectile dysfunction  Adderall therapy    - tadalafiL (Cialis) 20 mg tablet; Take 1 tab by mouth every 3 days as needed. No more than 1 tab in a 24 hour period. Indications: the inability to have an erection  Dispense: 30 Tablet; Refill: 0    Reviewed medication and completed medication reconciliation with the patient. Reviewed side effects of medications with the patient. Questions were answered and patient verb understanding.     Past Surgical History:   Procedure Laterality Date    HX COLONOSCOPY  8/8/14    diverticuli,  10 years    HX PROSTATECTOMY  12/2014    radical prostatectomy    HX UROLOGICAL  5/3/2013    prostate biopsy    HX VASECTOMY      Dr Kristian Robertson     Allergies and Intolerances:   No Known Allergies  Family History:   Family History   Problem Relation Age of Onset    Cancer Father         prostate cancer in his late 62s, carlos well into his 76s    Stroke Mother      Social History:   He  reports that he has quit smoking.  He has never used smokeless tobacco.   Social History     Substance and Sexual Activity   Alcohol Use Yes    Comment: occasianally     Immunization History:  Immunization History   Administered Date(s) Administered    COVID-19, MODERNA BLUE border, Primary or Immunocompromised, (age 18y+), IM, 100 mcg/0.5mL 02/03/2021, 03/03/2021    COVID-19, MODERNA Booster BLUE border, (age 18y+), IM, 50mcg/0.25mL 12/27/2021    Influenza, FLUARIX, FLULAVAL, (age 10 mo+) AND AFLURIA, FLUZONE (age 1 y+), PF 01/04/2021    TD Vaccine 07/03/2007    Tdap 07/24/2015         Follow up 6m CPE (UDS, CBC, CMP, Lipid, A1c)      Dr. Lizeth Dill, AGNP-C, DNP  [facility]

## 2022-08-29 NOTE — PROGRESS NOTES
Subjective    Marc Ache presents for ADD/ADHD follow up. Patient is doing well with current medication. Reports good focus and concentration as well as task completion. Denies heart palpitations, dizziness, jitteriness, ticks or twitches, or lightheadedness. Denies significant change in appetite. Weight is stable. Sleeping 6-8. [unfilled]    ROS:  Constitutional: Neg.  ENT- Neg. Pulmonary- Neg. Cardiac- Neg. Gastrointestinal- Neg. Genitourinary- Neg. Musculoskeletal- Neg. Neurologic- Neg. Derm. - Neg. Physical:   Exam:   Vital Signs: (As obtained by patient/caregiver at home)  There {WERE; WERE JQO:[id number]} vitals taken for this visit. PHYSICAL EXAMINATION:  [ INSTRUCTIONS:  \"[x]\" Indicates a positive item  \"[]\" Indicates a negative item  -- DELETE ALL ITEMS NOT EXAMINED]      Constitutional: [x] Appears well-developed and well-nourished [x] No apparent distress      [] Abnormal - {general (Optional):15988}    Mental status: [x] Alert and awake  [x] Oriented to person/place/time [x] Able to follow commands    [] Abnormal -     Eyes:   EOM    [x]  Normal    [] Abnormal -   Sclera  [x]  Normal    [] Abnormal -          Discharge [x]  None visible   [] Abnormal - {eye exam abnormal - (Optional):50176}    HENT, Neck: [x] Normocephalic, atraumatic  [] Abnormal - {right/left:33710}.   [x] Mouth/Throat: Mucous membranes are moist    Pulmonary/Chest: [x] Respiratory effort normal   [x] No visualized signs of difficulty breathing or respiratory distress        [] Abnormal - {pulm exam obs abnormal (Optional):86735}     Musculoskeletal:   [x] Normal gait with no signs of ataxia         [x] Normal range of motion of neck        [] Abnormal - {extremities abnormal (Optional):50643}    Neurological:        [x] No Facial Asymmetry (Cranial nerve 7 motor function) (limited exam due to video visit)          [x] No gaze palsy        [] Abnormal - {neuro exam abnormal (Optional):23311} {cranial nerves abnormal (Optional):52676}        Skin:        [x] No significant exanthematous lesions or discoloration noted on facial skin         [] Abnormal - {skin exam  abnormal (Optional):89307}           Psychiatric:       [x] Normal Affect [] Abnormal - {mental status exam abnormal (Optional):10183}       [x] No Hallucinations    Diagnosis:      {No Diagnosis Found}      Plan:   No worrisome findings of frequent need for increasing dose, 'loss of prescription', etc. Reviewed  Aware. Dispensing history is appropriate and congruent with patient reported history. There are no Patient Instructions on file for this visit. {LSPT CONTINUE:46676} current medication    [unfilled]    Follow up in 3 months.

## 2022-09-30 NOTE — TELEPHONE ENCOUNTER
Patient left a message requesting a refill on Adderall. Patient still has refills at the pharmacy for this medication. I contacted the pharmacy to confirm and requested they refill it for the patient. No further action needed. For [facility] in place:   Recommendation Provided To:    Intervention Detail: New Rx: 1, reason: Patient Preference  Gap Closed?:   Intervention Accepted By:   Time Spent (min): 5

## 2022-12-28 DIAGNOSIS — F90.0 ATTENTION DEFICIT HYPERACTIVITY DISORDER (ADHD), PREDOMINANTLY INATTENTIVE TYPE: ICD-10-CM

## 2022-12-28 RX ORDER — DEXTROAMPHETAMINE SACCHARATE, AMPHETAMINE ASPARTATE MONOHYDRATE, DEXTROAMPHETAMINE SULFATE AND AMPHETAMINE SULFATE 5; 5; 5; 5 MG/1; MG/1; MG/1; MG/1
20 CAPSULE, EXTENDED RELEASE ORAL
Qty: 30 CAPSULE | Refills: 0 | Status: SHIPPED | OUTPATIENT
Start: 2022-12-28 | End: 2023-01-27

## 2022-12-28 NOTE — TELEPHONE ENCOUNTER
Requested Prescriptions     Pending Prescriptions Disp Refills    amphetamine-dextroamphetamine XR (ADDERALL XR) 20 mg XR capsule 90 Capsule 0     Sig: Take 1 Capsule by mouth every morning. Max Daily Amount: 20 mg.         Pt will be out of mediation after tomorrow 12/29/22  Asking please expedite

## 2023-01-02 NOTE — TELEPHONE ENCOUNTER
Unsure as to why pt needed refill of adderall. 90 day supply given 11/27/2022. He needed to call his pharmacy for refill.

## 2023-01-04 DIAGNOSIS — N52.2 DRUG-INDUCED ERECTILE DYSFUNCTION: ICD-10-CM

## 2023-01-04 RX ORDER — TADALAFIL 20 MG/1
TABLET ORAL
Qty: 30 TABLET | Refills: 0 | Status: SHIPPED | OUTPATIENT
Start: 2023-01-04

## 2023-01-04 NOTE — TELEPHONE ENCOUNTER
Patient is requesting a printed script. Requested Prescriptions     Pending Prescriptions Disp Refills    tadalafiL (Cialis) 20 mg tablet 30 Tablet 0     Sig: Take 1 tab by mouth every 3 days as needed. No more than 1 tab in a 24 hour period.   Indications: the inability to have an erection

## 2023-03-21 DIAGNOSIS — Z00.00 LABORATORY TESTS ORDERED AS PART OF A COMPLETE PHYSICAL EXAM (CPE): Primary | ICD-10-CM

## 2023-03-21 DIAGNOSIS — R73.01 IMPAIRED FASTING GLUCOSE: ICD-10-CM

## 2023-03-21 DIAGNOSIS — Z51.81 THERAPEUTIC DRUG MONITORING: ICD-10-CM

## 2023-03-21 NOTE — PROGRESS NOTES
Diagnosis Orders   1. Laboratory tests ordered as part of a complete physical exam (CPE)  Urine Drug Screen    CBC with Auto Differential    Comprehensive Metabolic Panel    Lipid Panel    Hemoglobin A1C      2. Impaired fasting glucose  Hemoglobin A1C      3.  Therapeutic drug monitoring  Urine Drug Screen

## 2023-03-29 ENCOUNTER — OFFICE VISIT (OUTPATIENT)
Age: 63
End: 2023-03-29
Payer: COMMERCIAL

## 2023-03-29 VITALS
HEART RATE: 76 BPM | OXYGEN SATURATION: 97 % | HEIGHT: 69 IN | SYSTOLIC BLOOD PRESSURE: 137 MMHG | TEMPERATURE: 97.7 F | DIASTOLIC BLOOD PRESSURE: 89 MMHG | RESPIRATION RATE: 18 BRPM | BODY MASS INDEX: 34.3 KG/M2 | WEIGHT: 231.6 LBS

## 2023-03-29 DIAGNOSIS — N52.9 ERECTILE DYSFUNCTION, UNSPECIFIED ERECTILE DYSFUNCTION TYPE: ICD-10-CM

## 2023-03-29 DIAGNOSIS — F90.0 ATTENTION DEFICIT HYPERACTIVITY DISORDER (ADHD), PREDOMINANTLY INATTENTIVE TYPE: ICD-10-CM

## 2023-03-29 DIAGNOSIS — E66.1 CLASS 1 DRUG-INDUCED OBESITY WITHOUT SERIOUS COMORBIDITY WITH BODY MASS INDEX (BMI) OF 34.0 TO 34.9 IN ADULT: ICD-10-CM

## 2023-03-29 DIAGNOSIS — C61 PROSTATE CANCER (HCC): ICD-10-CM

## 2023-03-29 DIAGNOSIS — C67.9 MALIGNANT NEOPLASM OF URINARY BLADDER, UNSPECIFIED SITE (HCC): ICD-10-CM

## 2023-03-29 DIAGNOSIS — Z00.00 ANNUAL PHYSICAL EXAM: Primary | ICD-10-CM

## 2023-03-29 DIAGNOSIS — F17.200 CURRENT SMOKER ON SOME DAYS: ICD-10-CM

## 2023-03-29 PROBLEM — N32.89 BLADDER MASS: Status: RESOLVED | Noted: 2020-08-17 | Resolved: 2023-03-29

## 2023-03-29 PROBLEM — Z85.46 HISTORY OF PROSTATE CANCER: Status: RESOLVED | Noted: 2019-07-01 | Resolved: 2023-03-29

## 2023-03-29 PROBLEM — R73.01 IMPAIRED FASTING GLUCOSE: Status: RESOLVED | Noted: 2017-12-26 | Resolved: 2023-03-29

## 2023-03-29 PROBLEM — E66.811 CLASS 1 DRUG-INDUCED OBESITY WITHOUT SERIOUS COMORBIDITY WITH BODY MASS INDEX (BMI) OF 34.0 TO 34.9 IN ADULT: Status: ACTIVE | Noted: 2023-03-29

## 2023-03-29 PROCEDURE — 99396 PREV VISIT EST AGE 40-64: CPT | Performed by: NURSE PRACTITIONER

## 2023-03-29 RX ORDER — DEXTROAMPHETAMINE SACCHARATE, AMPHETAMINE ASPARTATE MONOHYDRATE, DEXTROAMPHETAMINE SULFATE AND AMPHETAMINE SULFATE 5; 5; 5; 5 MG/1; MG/1; MG/1; MG/1
20 CAPSULE, EXTENDED RELEASE ORAL EVERY MORNING
Qty: 90 CAPSULE | Refills: 0 | Status: SHIPPED | OUTPATIENT
Start: 2023-03-29 | End: 2023-03-31 | Stop reason: SDUPTHER

## 2023-03-29 RX ORDER — DEXTROAMPHETAMINE SACCHARATE, AMPHETAMINE ASPARTATE MONOHYDRATE, DEXTROAMPHETAMINE SULFATE AND AMPHETAMINE SULFATE 5; 5; 5; 5 MG/1; MG/1; MG/1; MG/1
20 CAPSULE, EXTENDED RELEASE ORAL EVERY MORNING
Qty: 90 CAPSULE | Refills: 0 | Status: SHIPPED | OUTPATIENT
Start: 2023-06-28 | End: 2023-09-26

## 2023-03-29 RX ORDER — TADALAFIL 20 MG/1
TABLET ORAL
Qty: 30 TABLET | Refills: 1 | Status: SHIPPED | OUTPATIENT
Start: 2023-03-29

## 2023-03-29 SDOH — ECONOMIC STABILITY: HOUSING INSECURITY
IN THE LAST 12 MONTHS, WAS THERE A TIME WHEN YOU DID NOT HAVE A STEADY PLACE TO SLEEP OR SLEPT IN A SHELTER (INCLUDING NOW)?: NO

## 2023-03-29 SDOH — ECONOMIC STABILITY: INCOME INSECURITY: HOW HARD IS IT FOR YOU TO PAY FOR THE VERY BASICS LIKE FOOD, HOUSING, MEDICAL CARE, AND HEATING?: NOT HARD AT ALL

## 2023-03-29 SDOH — ECONOMIC STABILITY: FOOD INSECURITY: WITHIN THE PAST 12 MONTHS, THE FOOD YOU BOUGHT JUST DIDN'T LAST AND YOU DIDN'T HAVE MONEY TO GET MORE.: NEVER TRUE

## 2023-03-29 SDOH — ECONOMIC STABILITY: FOOD INSECURITY: WITHIN THE PAST 12 MONTHS, YOU WORRIED THAT YOUR FOOD WOULD RUN OUT BEFORE YOU GOT MONEY TO BUY MORE.: NEVER TRUE

## 2023-03-29 ASSESSMENT — PATIENT HEALTH QUESTIONNAIRE - PHQ9
SUM OF ALL RESPONSES TO PHQ9 QUESTIONS 1 & 2: 0
SUM OF ALL RESPONSES TO PHQ QUESTIONS 1-9: 0
SUM OF ALL RESPONSES TO PHQ QUESTIONS 1-9: 0
2. FEELING DOWN, DEPRESSED OR HOPELESS: 0
SUM OF ALL RESPONSES TO PHQ QUESTIONS 1-9: 0
SUM OF ALL RESPONSES TO PHQ QUESTIONS 1-9: 0
1. LITTLE INTEREST OR PLEASURE IN DOING THINGS: 0

## 2023-03-29 NOTE — ASSESSMENT & PLAN NOTE
Well-controlled, continue current medications   UDS and Sub agreement obtained  I have reviewed this patient's controlled substance prescription history through the Prescription Monitoring Program. No inconsistencies noted.   F/u 6m F2F or virtual

## 2023-03-29 NOTE — LETTER
contact my HIPAA contact if there are concerns about my safety and use of the controlled medications. I have agreed to use the prescribed controlled substance medications to me as instructed by my provider and as stated in this Medication Agreement. My initial on each page and my signature below shows that I have read each page and I have had the opportunity to ask questions with answers provided by my provider.     Patient Name (Printed): _____________________________________  Patient Signature:  ______________________   Date: _____________    Prescriber Name (Printed): ___________________________________  Prescriber Signature: _____________________  Date: _____________

## 2023-03-29 NOTE — ASSESSMENT & PLAN NOTE
Monitored by specialist- no acute findings meriting change in the plan   Completes Cystoscopy every 3m  Dr Jaylon More

## 2023-03-29 NOTE — ASSESSMENT & PLAN NOTE
BMI is out of normal parameters and plan is as follows: Discussed diet, portion control, exercise, avoiding foods high in sugar, carbs and starches, fatty/greasy foods and to eat until satisfied not til full.

## 2023-03-29 NOTE — ASSESSMENT & PLAN NOTE
Placed order for EKG-baseline  Colonoscopy: 8/2024  PSA: Monitored by urology  Immunizations: Shingles due  LDCT: N/A  Eye Exam: Patient to schedule

## 2023-03-31 ENCOUNTER — TELEPHONE (OUTPATIENT)
Age: 63
End: 2023-03-31

## 2023-03-31 RX ORDER — DEXTROAMPHETAMINE SACCHARATE, AMPHETAMINE ASPARTATE MONOHYDRATE, DEXTROAMPHETAMINE SULFATE AND AMPHETAMINE SULFATE 5; 5; 5; 5 MG/1; MG/1; MG/1; MG/1
20 CAPSULE, EXTENDED RELEASE ORAL EVERY MORNING
Qty: 30 CAPSULE | Refills: 0 | Status: SHIPPED | OUTPATIENT
Start: 2023-05-30 | End: 2023-06-29

## 2023-03-31 RX ORDER — DEXTROAMPHETAMINE SACCHARATE, AMPHETAMINE ASPARTATE MONOHYDRATE, DEXTROAMPHETAMINE SULFATE AND AMPHETAMINE SULFATE 5; 5; 5; 5 MG/1; MG/1; MG/1; MG/1
20 CAPSULE, EXTENDED RELEASE ORAL EVERY MORNING
Qty: 30 CAPSULE | Refills: 0 | Status: SHIPPED | OUTPATIENT
Start: 2023-03-31 | End: 2023-04-30

## 2023-03-31 RX ORDER — DEXTROAMPHETAMINE SACCHARATE, AMPHETAMINE ASPARTATE MONOHYDRATE, DEXTROAMPHETAMINE SULFATE AND AMPHETAMINE SULFATE 5; 5; 5; 5 MG/1; MG/1; MG/1; MG/1
20 CAPSULE, EXTENDED RELEASE ORAL EVERY MORNING
Qty: 30 CAPSULE | Refills: 0 | Status: SHIPPED | OUTPATIENT
Start: 2023-04-30 | End: 2023-05-30

## 2023-03-31 NOTE — TELEPHONE ENCOUNTER
Per pt [facility]  does not have enough Adderall 20 mg in stock to do a 90 day supply. He was advised if he accepted what they have he would forfeit the bal of the rx    Stated they can fill a 30 day supply.     He is asking if you will DC that RX and send 3 separate RX for 30 days supply so he can filled then consecutively

## 2023-03-31 NOTE — PROGRESS NOTES
[facility] presents today for   Chief Complaint   Patient presents with    Annual Exam    Discuss Labs     Completed 3/21/2023       1. \"Have you been to the ER, urgent care clinic since your last visit? Hospitalized since your last visit? \" No.    2. \"Have you seen or consulted any other health care providers outside of the [address] [place] [address] since your last visit? \" No.     3. For patients aged 39-70: Has the patient had a colonoscopy / FIT/ Cologuard? Yes      If the patient is female:    4. For patients aged 41-77: Has the patient had a mammogram within the past 2 years? N/a  See top three    5. For patients aged 21-65: Has the patient had a pap smear?  N/a
UPDATE 3/31/23:  Dc adderall for 90 tabs due to shortage. Sent in 30 day supply for 3 months    I have reviewed this patient's controlled substance prescription history through the Prescription Monitoring Program. No inconsistencies noted.
significant adenopathy  Chest - clear to auscultation, no wheezes, rales or rhonchi, symmetric air entry  Heart - normal rate, regular rhythm, normal S1, S2, no murmurs, rubs, clicks or gallops  Abdomen - soft, nontender, nondistended, no masses or organomegaly  Neurological - alert, oriented, normal speech, no focal findings or movement disorder noted  Musculoskeletal - no joint tenderness, deformity or swelling  Extremities - peripheral pulses normal, no pedal edema, no clubbing or cyanosis  Skin - normal coloration and turgor, no rashes, no suspicious skin lesions noted    Lab Review:  TG ; LDL 80-68 didnt fast   A1c 5.8-6.1  CMP/CBC normal    Assessment:  1. Annual physical exam  Assessment & Plan:  Placed order for EKG-baseline  Colonoscopy: 8/2024  PSA: Monitored by urology  Immunizations: Shingles due  LDCT: N/A  Eye Exam: Patient to schedule  Orders:  -     EKG 12 Lead, Inital; Future  2. Erectile dysfunction, unspecified erectile dysfunction type  Assessment & Plan:   Well-controlled, continue current medications   RX printed and handed to pt  Orders:  -     tadalafil (CIALIS) 20 MG tablet; Take 1 tab by mouth every 3 days as needed; not to exceed 1 tab in 24 hours. , Disp-30 tablet, R-1Print  3. Attention deficit hyperactivity disorder (ADHD), predominantly inattentive type  Assessment & Plan:   Well-controlled, continue current medications   UDS and Sub agreement obtained  I have reviewed this patient's controlled substance prescription history through the Prescription Monitoring Program. No inconsistencies noted. F/u 6m F2F or virtual  Orders:  -     amphetamine-dextroamphetamine (ADDERALL XR) 20 MG extended release capsule; Take 1 capsule by mouth every morning for 90 days. Max Daily Amount: 20 mg, Disp-90 capsule, R-0Normal  -     amphetamine-dextroamphetamine (ADDERALL XR) 20 MG extended release capsule; Take 1 capsule by mouth every morning for 90 days.  Max Daily Amount: 20 mg, Disp-90 capsule,

## 2023-04-28 PROBLEM — Z00.00 ANNUAL PHYSICAL EXAM: Status: RESOLVED | Noted: 2023-03-29 | Resolved: 2023-04-28

## 2023-06-30 DIAGNOSIS — F90.0 ATTENTION DEFICIT HYPERACTIVITY DISORDER (ADHD), PREDOMINANTLY INATTENTIVE TYPE: Primary | ICD-10-CM

## 2023-07-03 RX ORDER — DEXTROAMPHETAMINE SACCHARATE, AMPHETAMINE ASPARTATE MONOHYDRATE, DEXTROAMPHETAMINE SULFATE AND AMPHETAMINE SULFATE 5; 5; 5; 5 MG/1; MG/1; MG/1; MG/1
20 CAPSULE, EXTENDED RELEASE ORAL EVERY MORNING
Qty: 90 CAPSULE | Refills: 0 | Status: SHIPPED | OUTPATIENT
Start: 2023-07-03 | End: 2023-10-01

## 2023-07-03 NOTE — TELEPHONE ENCOUNTER
Pt is at pharmacy waiting for the following RX. He had spoken with the office on Friday afternoon after finding out which pharmacy had the medication in stock so Dr Aubrey Langford could send it there. That never happened. Sending to Dr Morteza Wells (provider on call).  Can you please send Adderall XR 20 mg to     [address] [place] [address] [id number] is waiting at the pharmacy now

## 2023-07-06 RX ORDER — DEXTROAMPHETAMINE SACCHARATE, AMPHETAMINE ASPARTATE MONOHYDRATE, DEXTROAMPHETAMINE SULFATE AND AMPHETAMINE SULFATE 5; 5; 5; 5 MG/1; MG/1; MG/1; MG/1
CAPSULE, EXTENDED RELEASE ORAL
Qty: 30 CAPSULE | OUTPATIENT
Start: 2023-07-06

## 2023-07-10 ENCOUNTER — HOSPITAL ENCOUNTER (OUTPATIENT)
Facility: HOSPITAL | Age: 63
Discharge: HOME | End: 2023-07-13

## 2023-07-10 DIAGNOSIS — Z00.00 ANNUAL PHYSICAL EXAM: ICD-10-CM

## 2023-07-10 LAB
EKG ATRIAL RATE: 75 BPM
EKG DIAGNOSIS: NORMAL
EKG P AXIS: 17 DEGREES
EKG P-R INTERVAL: 130 MS
EKG Q-T INTERVAL: 396 MS
EKG QRS DURATION: 88 MS
EKG QTC CALCULATION (BAZETT): 442 MS
EKG R AXIS: -21 DEGREES
EKG T AXIS: 8 DEGREES
EKG VENTRICULAR RATE: 75 BPM

## 2023-09-21 ENCOUNTER — TELEMEDICINE (OUTPATIENT)
Age: 63
End: 2023-09-21
Payer: COMMERCIAL

## 2023-09-21 DIAGNOSIS — N52.9 ERECTILE DYSFUNCTION, UNSPECIFIED ERECTILE DYSFUNCTION TYPE: ICD-10-CM

## 2023-09-21 DIAGNOSIS — Z51.81 THERAPEUTIC DRUG MONITORING: ICD-10-CM

## 2023-09-21 DIAGNOSIS — F90.0 ATTENTION DEFICIT HYPERACTIVITY DISORDER (ADHD), PREDOMINANTLY INATTENTIVE TYPE: Primary | ICD-10-CM

## 2023-09-21 DIAGNOSIS — Z00.00 LABORATORY TESTS ORDERED AS PART OF A COMPLETE PHYSICAL EXAM (CPE): ICD-10-CM

## 2023-09-21 DIAGNOSIS — E78.1 HYPERTRIGLYCERIDEMIA: ICD-10-CM

## 2023-09-21 DIAGNOSIS — R73.03 PREDIABETES: ICD-10-CM

## 2023-09-21 PROCEDURE — 99213 OFFICE O/P EST LOW 20 MIN: CPT | Performed by: NURSE PRACTITIONER

## 2023-09-21 RX ORDER — TADALAFIL 20 MG/1
TABLET ORAL
Qty: 30 TABLET | Refills: 1 | Status: SHIPPED | OUTPATIENT
Start: 2023-09-21

## 2023-09-21 RX ORDER — DEXTROAMPHETAMINE SACCHARATE, AMPHETAMINE ASPARTATE MONOHYDRATE, DEXTROAMPHETAMINE SULFATE AND AMPHETAMINE SULFATE 5; 5; 5; 5 MG/1; MG/1; MG/1; MG/1
20 CAPSULE, EXTENDED RELEASE ORAL EVERY MORNING
Qty: 90 CAPSULE | Refills: 0 | Status: SHIPPED | OUTPATIENT
Start: 2023-10-01 | End: 2023-12-30

## 2023-09-21 NOTE — PROGRESS NOTES
Chief Complaint   Patient presents with    Mental Health Problem         1. \"Have you been to the ER, urgent care clinic since your last visit? Hospitalized since your last visit? \" No    2. \"Have you seen or consulted any other health care providers outside of the [address] [place] [address] since your last visit? \" No     3. For patients aged 43-73: Has the patient had a colonoscopy / FIT/ Cologuard?  Yes - no Care Gap present
Onset    Cancer Father         prostate    Stroke Mother      Social History:   He  reports that he has been smoking cigarettes. He started smoking about 40 years ago. He has been smoking an average of .25 packs per day. He has never used smokeless tobacco.   Social History     Substance and Sexual Activity   Alcohol Use Yes     Immunization History:  Immunization History   Administered Date(s) Administered    COVID-19, MODERNA BLUE border, Primary or Immunocompromised, (age 12y+), IM, 100 mcg/0.5mL 02/03/2021, 03/03/2021    Influenza, FLUARIX, FLULAVAL, FLUZONE (age 10 mo+) AND AFLURIA, (age 1 y+), PF, 0.5mL 01/04/2021    TDaP, ADACEL (age 6y-58y), Jacques Sales (age 10y+), IM, 0.5mL 07/24/2015    Td vaccine (adult) 07/03/2007         Physical:   Exam:   Vital Signs: (As obtained by patient/caregiver at home)  There were not vitals taken for this visit. PHYSICAL EXAMINATION:  [ INSTRUCTIONS:  \"[x]\" Indicates a positive item  \"[]\" Indicates a negative item  -- DELETE ALL ITEMS NOT EXAMINED]      Constitutional: [x] Appears well-developed and well-nourished [] No apparent distress      [] Abnormal -     Mental status: [x] Alert and awake  [x] Oriented to person/place/time [x] Able to follow commands    [] Abnormal -     Eyes:   EOM    [x]  Normal    [] Abnormal -   Sclera  [x]  Normal    [] Abnormal -          Discharge [x]  None visible   [] Abnormal -     HENT, Neck: [x] Normocephalic, atraumatic  [] Abnormal - .   [x] Mouth/Throat: Mucous membranes are moist    Pulmonary/Chest: [x] Respiratory effort normal   [x] No visualized signs of difficulty breathing or respiratory distress        [] Abnormal -      Musculoskeletal:   [] Normal gait with no signs of ataxia         [] Normal range of motion of neck        [] Abnormal -         Skin:        [] No significant exanthematous lesions or discoloration noted on facial skin         [] Abnormal -            Psychiatric:       [x] Normal Affect [] Abnormal -        [] No
No

## 2023-09-21 NOTE — ASSESSMENT & PLAN NOTE
Well-controlled, continue current medications   I have reviewed this patient's controlled substance prescription history through the Prescription Monitoring Program. No inconsistencies noted.

## 2023-11-24 DIAGNOSIS — N52.9 ERECTILE DYSFUNCTION, UNSPECIFIED ERECTILE DYSFUNCTION TYPE: ICD-10-CM

## 2023-11-27 RX ORDER — TADALAFIL 20 MG/1
TABLET ORAL
Qty: 30 TABLET | Refills: 1 | OUTPATIENT
Start: 2023-11-27

## 2023-11-27 NOTE — TELEPHONE ENCOUNTER
Sent in refill for cialis 30 tabs plus 1 refill in Sept. He should not be taking more than 10 tabs per month. Have him call his pharmacy as he should have another refill.  Thank you

## 2024-01-05 ENCOUNTER — TELEPHONE (OUTPATIENT)
Age: 64
End: 2024-01-05

## 2024-01-05 DIAGNOSIS — F90.0 ATTENTION DEFICIT HYPERACTIVITY DISORDER (ADHD), PREDOMINANTLY INATTENTIVE TYPE: ICD-10-CM

## 2024-01-05 RX ORDER — DEXTROAMPHETAMINE SACCHARATE, AMPHETAMINE ASPARTATE MONOHYDRATE, DEXTROAMPHETAMINE SULFATE AND AMPHETAMINE SULFATE 5; 5; 5; 5 MG/1; MG/1; MG/1; MG/1
20 CAPSULE, EXTENDED RELEASE ORAL EVERY MORNING
Qty: 90 CAPSULE | Refills: 0 | Status: SHIPPED | OUTPATIENT
Start: 2024-01-05 | End: 2024-04-04

## 2024-01-05 NOTE — TELEPHONE ENCOUNTER
RX PICKED UP    [facility]  [phone]  Fax: [phone]   Pre-operative History and Physical    Clearncaparna Campos is a 73 y.o. female who is referred by Dr Elvira Padgett for a consultation for preoperative physical examination and medical clearance for surgery. Patient is scheduled to have Left total hip makoplasty with cells at [facility] on 6-3-2020. DIAGNOSIS:  Osteoarthritis left hip        History Obtained From:  patient    HISTORY OF PRESENT ILLNESS:    The patient is a 73 y.o. female with significant past medical history of osteoarthritis of left hip who presents for pre-op       Past Medical History:   Diagnosis Date    Basal cell carcinoma     Cancer ([facility] [address]     BCC and SCC    Hyperlipidemia     Hypertension     Melanoma ([facility] [address]     Pre-diabetes 6/19/2013    Thyroid disease      Past Surgical History:   Procedure Laterality Date    AORTIC VALVE REPLACEMENT  02/2019    CARDIAC CATHETERIZATION  01/04/2019    Non Obs CAD    HEMORRHOID SURGERY      HYSTERECTOMY      DUB/ ATYPICAL CELLS/ OOPHERECTOMY    MALIGNANT SKIN LESION EXCISION Right     right deltoid, melanoma, dr Blanca Booth, seeing derm    TONSILLECTOMY      TUBAL LIGATION       Current Outpatient Medications   Medication Sig Dispense Refill    levothyroxine (SYNTHROID) 100 MCG tablet TAKE 1 TABLET EVERY DAY 90 tablet 1    metFORMIN (GLUCOPHAGE) 500 MG tablet Take 1 tablet by mouth daily (with breakfast) 90 tablet 1    atorvastatin (LIPITOR) 40 MG tablet Take 40 mg by mouth daily      ezetimibe (ZETIA) 10 MG tablet Take 10 mg by mouth daily      benazepril (LOTENSIN) 20 MG tablet TAKE 1 TABLET EVERY DAY 90 tablet 1    REPATHA SURECLICK 259 MG/ML SOAJ Twice monthly      chlorthalidone (HYGROTON) 25 MG tablet Take 25 mg by mouth daily      melatonin 5 MG TABS tablet nightly as needed        No current facility-administered medications for this visit.           Allergies:  Adhesive tape and Dye [iodides]  History of allergic

## 2024-01-05 NOTE — TELEPHONE ENCOUNTER
RX printed and signed.     Requested Prescriptions     Signed Prescriptions Disp Refills    amphetamine-dextroamphetamine (ADDERALL XR) 20 MG extended release capsule 90 capsule 0     Sig: Take 1 capsule by mouth every morning for 90 days. Max Daily Amount: 20 mg     Authorizing Provider: BRYNN KIM     I have reviewed this patient's controlled substance prescription history through the Prescription Monitoring Program. No inconsistencies noted.

## 2024-01-05 NOTE — TELEPHONE ENCOUNTER
Pt called and stated that he needs a refill for amphetamine-dextroamphetamine (ADDERALL XR) 20 MG. Pt would like a printed script to take to a different pharmacy, due to [facility] closing.     Pt would like to  today if possible call back number [phone]

## 2024-02-05 ENCOUNTER — TELEPHONE (OUTPATIENT)
Age: 64
End: 2024-02-05

## 2024-02-05 DIAGNOSIS — Z12.11 SPECIAL SCREENING FOR MALIGNANT NEOPLASMS, COLON: Primary | ICD-10-CM

## 2024-02-05 NOTE — TELEPHONE ENCOUNTER
----- Message from Yancy Kovacs sent at 2/5/2024  9:20 AM EST -----  Subject: Referral Request    Reason for referral request? Pt needs a referral for Colonoscopy, he   states he hasn't had one done since he was 52 yrs old. Please call pt to   notify him once this is placed so he can get scheduled.   Provider patient wants to be referred to(if known):     Provider Phone Number(if known):    Additional Information for Provider?   ---------------------------------------------------------------------------  --------------  CALL BACK INFO    [phone]; OK to leave message on voicemail  ---------------------------------------------------------------------------  --------------

## 2024-03-15 DIAGNOSIS — Z00.00 LABORATORY TESTS ORDERED AS PART OF A COMPLETE PHYSICAL EXAM (CPE): ICD-10-CM

## 2024-03-15 DIAGNOSIS — E78.1 HYPERTRIGLYCERIDEMIA: ICD-10-CM

## 2024-03-15 DIAGNOSIS — R73.03 PREDIABETES: ICD-10-CM

## 2024-03-26 LAB
6-ACETYLMORPHINE SCREEN URINE: ABNORMAL
A/G RATIO: 1.9 RATIO (ref 1.1–2.6)
ALBUMIN SERPL-MCNC: 5 G/DL (ref 3.5–5)
ALP BLD-CCNC: 68 U/L (ref 40–125)
ALT SERPL-CCNC: 26 U/L (ref 5–40)
AMPHETAMINE SCREEN, URINE: ABNORMAL
ANION GAP SERPL CALCULATED.3IONS-SCNC: 11 MMOL/L (ref 3–15)
AST SERPL-CCNC: 27 U/L (ref 10–37)
BARBITURATES: ABNORMAL
BASOPHILS # BLD: 1 % (ref 0–2)
BASOPHILS ABSOLUTE: 0.1 K/UL (ref 0–0.2)
BENZODIAZEPINES: ABNORMAL
BILIRUB SERPL-MCNC: 0.5 MG/DL (ref 0.2–1.2)
BUN BLDV-MCNC: 15 MG/DL (ref 6–22)
CALCIUM SERPL-MCNC: 10.1 MG/DL (ref 8.4–10.5)
CANNABINOIDS: ABNORMAL
CHLORIDE BLD-SCNC: 101 MMOL/L (ref 98–110)
CHOLESTEROL/HDL RATIO: 3.8 (ref 0–5)
CHOLESTEROL: 151 MG/DL (ref 110–200)
CO2: 27 MMOL/L (ref 20–32)
COCAINE (METABOLITE): ABNORMAL
CREAT SERPL-MCNC: 0.8 MG/DL (ref 0.8–1.6)
EOSINOPHIL # BLD: 1 % (ref 0–6)
EOSINOPHILS ABSOLUTE: 0.1 K/UL (ref 0–0.5)
ESTIMATED AVERAGE GLUCOSE: 118 MG/DL (ref 91–123)
GLOBULIN: 2.6 G/DL (ref 2–4)
GLOMERULAR FILTRATION RATE: >60 ML/MIN/1.73 SQ.M.
GLUCOSE: 119 MG/DL (ref 70–99)
HBA1C MFR BLD: 5.8 % (ref 4.8–5.6)
HCT VFR BLD CALC: 52.8 % (ref 39.3–51.6)
HDLC SERPL-MCNC: 40 MG/DL
HEMOGLOBIN: 16.3 G/DL (ref 13.1–17.2)
HYDROCODONE, URINE: ABNORMAL
LDL CHOLESTEROL CALCULATED: 92 MG/DL (ref 50–99)
LDL/HDL RATIO: 2.3
LYMPHOCYTES # BLD: 11 % (ref 20–45)
LYMPHOCYTES ABSOLUTE: 1 K/UL (ref 1–4.8)
MCH RBC QN AUTO: 28 PG (ref 26–34)
MCHC RBC AUTO-ENTMCNC: 31 G/DL (ref 31–36)
MCV RBC AUTO: 90 FL (ref 80–95)
METHADONE SCREEN, URINE: ABNORMAL
MONOCYTES ABSOLUTE: 0.7 K/UL (ref 0.1–1)
MONOCYTES: 8 % (ref 3–12)
NEUTROPHILS ABSOLUTE: 7.5 K/UL (ref 1.8–7.7)
NEUTROPHILS: 79 % (ref 40–75)
NON-HDL CHOLESTEROL: 111 MG/DL
OPIATES: ABNORMAL
OXYCODONE URINE: ABNORMAL
PCP SCREEN: ABNORMAL
PDW BLD-RTO: 16.3 % (ref 10–15.5)
PH, URINE: 5.7 (ref 4.5–8)
PLATELET # BLD: 250 K/UL (ref 140–440)
PMV BLD AUTO: 12.1 FL (ref 9–13)
POTASSIUM SERPL-SCNC: 4.7 MMOL/L (ref 3.5–5.5)
PROSTATE SPECIFIC ANTIGEN: <0.014 NG/ML
RBC: 5.84 M/UL (ref 3.8–5.8)
SODIUM BLD-SCNC: 139 MMOL/L (ref 133–145)
SPECIFIC GRAVITY UA: 1.02 (ref 1–1.03)
TOTAL PROTEIN: 7.6 G/DL (ref 6.2–8.1)
TRIGL SERPL-MCNC: 92 MG/DL (ref 40–149)
VLDLC SERPL CALC-MCNC: 18 MG/DL (ref 8–30)
WBC: 9.5 K/UL (ref 4–11)

## 2024-04-02 PROBLEM — E66.9 OBESITY (BMI 30-39.9): Status: RESOLVED | Noted: 2019-07-01 | Resolved: 2024-04-02

## 2024-05-02 PROBLEM — Z00.00 ANNUAL PHYSICAL EXAM: Status: RESOLVED | Noted: 2023-03-29 | Resolved: 2024-05-02

## 2024-09-26 ENCOUNTER — TELEMEDICINE (OUTPATIENT)
Facility: CLINIC | Age: 64
End: 2024-09-26
Payer: COMMERCIAL

## 2024-09-26 DIAGNOSIS — R73.03 PREDIABETES: ICD-10-CM

## 2024-09-26 DIAGNOSIS — N52.9 ERECTILE DYSFUNCTION, UNSPECIFIED ERECTILE DYSFUNCTION TYPE: ICD-10-CM

## 2024-09-26 DIAGNOSIS — Z12.5 SPECIAL SCREENING FOR MALIGNANT NEOPLASM OF PROSTATE: ICD-10-CM

## 2024-09-26 DIAGNOSIS — F90.0 ATTENTION DEFICIT HYPERACTIVITY DISORDER (ADHD), PREDOMINANTLY INATTENTIVE TYPE: ICD-10-CM

## 2024-09-26 DIAGNOSIS — Z51.81 THERAPEUTIC DRUG MONITORING: ICD-10-CM

## 2024-09-26 DIAGNOSIS — E78.1 HYPERTRIGLYCERIDEMIA: Primary | ICD-10-CM

## 2024-09-26 PROCEDURE — 99213 OFFICE O/P EST LOW 20 MIN: CPT | Performed by: NURSE PRACTITIONER

## 2024-09-26 RX ORDER — DEXTROAMPHETAMINE SACCHARATE, AMPHETAMINE ASPARTATE MONOHYDRATE, DEXTROAMPHETAMINE SULFATE AND AMPHETAMINE SULFATE 5; 5; 5; 5 MG/1; MG/1; MG/1; MG/1
20 CAPSULE, EXTENDED RELEASE ORAL EVERY MORNING
Qty: 90 CAPSULE | Refills: 0 | Status: SHIPPED | OUTPATIENT
Start: 2025-01-04 | End: 2025-04-09

## 2024-09-26 RX ORDER — DEXTROAMPHETAMINE SACCHARATE, AMPHETAMINE ASPARTATE MONOHYDRATE, DEXTROAMPHETAMINE SULFATE AND AMPHETAMINE SULFATE 5; 5; 5; 5 MG/1; MG/1; MG/1; MG/1
20 CAPSULE, EXTENDED RELEASE ORAL EVERY MORNING
Qty: 90 CAPSULE | Refills: 0 | Status: SHIPPED | OUTPATIENT
Start: 2024-10-07 | End: 2025-01-05

## 2024-09-26 RX ORDER — TADALAFIL 20 MG/1
TABLET ORAL
Qty: 30 TABLET | Refills: 1 | Status: SHIPPED | OUTPATIENT
Start: 2024-09-26

## 2024-10-29 ENCOUNTER — TELEPHONE (OUTPATIENT)
Facility: CLINIC | Age: 64
End: 2024-10-29

## 2024-10-29 NOTE — TELEPHONE ENCOUNTER
Pt came and dropped off forms for provider to fill out  Advised Pt PCP will be back 11/6/24    Copy placed in providers box

## 2024-11-05 ENCOUNTER — TELEPHONE (OUTPATIENT)
Facility: CLINIC | Age: 64
End: 2024-11-05

## 2024-11-05 NOTE — TELEPHONE ENCOUNTER
Spoke w/ pt to inform that PCP reviewed the form that was dropped off, however the pages were cut off and difficult to read. Advise pt to obtain a clear copy from his job. Pt verbalized understanding.

## 2024-11-06 ENCOUNTER — TELEPHONE (OUTPATIENT)
Facility: CLINIC | Age: 64
End: 2024-11-06

## 2024-11-06 NOTE — TELEPHONE ENCOUNTER
Pt came to office and dropped off new form for Dr Shetty to fill out. Placed a copy of form in purple folder and placed the original form in Dr Shetty's office. Pt would like to be contacted when form is ready for pick-up. Please advise.

## 2024-11-07 NOTE — TELEPHONE ENCOUNTER
Una,  Please wish patient a happy birthday.  I have completed his \"[employer]  application and physicians certificate\" form and placed it in the form.  Please obtain the form and follow instructions on the notes and scanned into the chart.  Once all completed, please call patient to come to the office to obtain.  Thank you

## 2025-01-07 ENCOUNTER — TELEPHONE (OUTPATIENT)
Facility: CLINIC | Age: 65
End: 2025-01-07

## 2025-01-07 NOTE — TELEPHONE ENCOUNTER
Refill request via fax     Medication:   amphetamine-dextroamphetamine (ADDERALL XR) 20 MG extended release capsule   Quantity: 90  Pharmacy:   [id number] - [address]      Last Fill: 10.7.2024    PCP: Nicolette Shetty DNP    LAST OFFICE VISIT: 9.26.2024      Future Appointments   Date Time Provider Department Center   3/26/2025  8:00 AM Nicolette Shetty DNP [facility]

## 2025-03-03 DIAGNOSIS — R73.03 PREDIABETES: ICD-10-CM

## 2025-03-03 DIAGNOSIS — E78.1 HYPERTRIGLYCERIDEMIA: ICD-10-CM

## 2025-03-03 DIAGNOSIS — Z12.5 SPECIAL SCREENING FOR MALIGNANT NEOPLASM OF PROSTATE: ICD-10-CM

## 2025-03-19 ENCOUNTER — LAB (OUTPATIENT)
Facility: CLINIC | Age: 65
End: 2025-03-19

## 2025-03-19 ENCOUNTER — HOSPITAL ENCOUNTER (OUTPATIENT)
Facility: HOSPITAL | Age: 65
Setting detail: SPECIMEN
Discharge: HOME | End: 2025-03-22

## 2025-03-19 DIAGNOSIS — R73.03 PREDIABETES: ICD-10-CM

## 2025-03-19 PROCEDURE — 99001 SPECIMEN HANDLING PT-LAB: CPT

## 2025-03-20 LAB
A/G RATIO: 2.2 RATIO (ref 1.1–2.6)
ALBUMIN: 4.6 G/DL (ref 3.5–5)
ALP BLD-CCNC: 59 U/L (ref 40–125)
ALT SERPL-CCNC: 24 U/L (ref 5–40)
ANION GAP SERPL CALCULATED.3IONS-SCNC: 13 MMOL/L (ref 3–15)
AST SERPL-CCNC: 25 U/L (ref 10–37)
BASOPHILS # BLD: 1 % (ref 0–2)
BASOPHILS ABSOLUTE: 0.1 K/UL (ref 0–0.2)
BILIRUB SERPL-MCNC: 0.4 MG/DL (ref 0.2–1.2)
BUN BLDV-MCNC: 14 MG/DL (ref 6–22)
CALCIUM SERPL-MCNC: 9.4 MG/DL (ref 8.4–10.5)
CANNABINOIDS: NORMAL
CHLORIDE BLD-SCNC: 100 MMOL/L (ref 98–110)
CHOLESTEROL, TOTAL: 148 MG/DL (ref 110–200)
CHOLESTEROL/HDL RATIO: 4.4 (ref 0–5)
CO2: 26 MMOL/L (ref 20–32)
COCAINE: NORMAL
CREAT SERPL-MCNC: 0.8 MG/DL (ref 0.8–1.6)
EOSINOPHIL # BLD: 2 % (ref 0–6)
EOSINOPHILS ABSOLUTE: 0.1 K/UL (ref 0–0.5)
ESTIMATED AVERAGE GLUCOSE: 126 MG/DL (ref 91–123)
GFR, ESTIMATED: >60 ML/MIN/1.73 SQ.M.
GLOBULIN: 2.1 G/DL (ref 2–4)
GLUCOSE: 125 MG/DL (ref 70–99)
HBA1C MFR BLD: 6 % (ref 4.8–5.6)
HCT VFR BLD CALC: 46.6 % (ref 39.3–51.6)
HDLC SERPL-MCNC: 34 MG/DL
HEMOGLOBIN: 15.2 G/DL (ref 13.1–17.2)
LDL CHOLESTEROL: 96 MG/DL (ref 50–99)
LDL/HDL RATIO: 2.8
LYMPHOCYTES # BLD: 25 % (ref 20–45)
LYMPHOCYTES ABSOLUTE: 1.5 K/UL (ref 1–4.8)
MCH RBC QN AUTO: 28 PG (ref 26–34)
MCHC RBC AUTO-ENTMCNC: 33 G/DL (ref 31–36)
MCV RBC AUTO: 86 FL (ref 80–95)
MONOCYTES ABSOLUTE: 0.7 K/UL (ref 0.1–1)
MONOCYTES: 11 % (ref 3–12)
NEUTROPHILS ABSOLUTE: 3.8 K/UL (ref 1.8–7.7)
NEUTROPHILS SEGMENTED: 62 % (ref 40–75)
NON-HDL CHOLESTEROL: 114 MG/DL
PDW BLD-RTO: 15.1 % (ref 10–15.5)
PLATELET # BLD: 214 K/UL (ref 140–440)
PMV BLD AUTO: 12.2 FL (ref 9–13)
POTASSIUM SERPL-SCNC: 4.2 MMOL/L (ref 3.5–5.5)
RBC # BLD: 5.4 M/UL (ref 3.8–5.8)
SCREENING PSA: <0.014 NG/ML
SENTARA SPECIMEN COLLECTION: NORMAL
SODIUM BLD-SCNC: 139 MMOL/L (ref 133–145)
SPECIFIC GRAVITY UA: NORMAL (ref 4.5–8)
TOTAL PROTEIN: 6.7 G/DL (ref 6.2–8.1)
TRIGL SERPL-MCNC: 87 MG/DL (ref 40–149)
VLDLC SERPL CALC-MCNC: 17 MG/DL (ref 8–30)
WBC # BLD: 6.2 K/UL (ref 4–11)

## 2025-03-25 SDOH — ECONOMIC STABILITY: FOOD INSECURITY: WITHIN THE PAST 12 MONTHS, YOU WORRIED THAT YOUR FOOD WOULD RUN OUT BEFORE YOU GOT MONEY TO BUY MORE.: NEVER TRUE

## 2025-03-25 SDOH — ECONOMIC STABILITY: FOOD INSECURITY: WITHIN THE PAST 12 MONTHS, THE FOOD YOU BOUGHT JUST DIDN'T LAST AND YOU DIDN'T HAVE MONEY TO GET MORE.: NEVER TRUE

## 2025-03-25 SDOH — ECONOMIC STABILITY: INCOME INSECURITY: IN THE LAST 12 MONTHS, WAS THERE A TIME WHEN YOU WERE NOT ABLE TO PAY THE MORTGAGE OR RENT ON TIME?: NO

## 2025-03-25 SDOH — ECONOMIC STABILITY: TRANSPORTATION INSECURITY
IN THE PAST 12 MONTHS, HAS LACK OF TRANSPORTATION KEPT YOU FROM MEETINGS, WORK, OR FROM GETTING THINGS NEEDED FOR DAILY LIVING?: NO

## 2025-03-25 SDOH — ECONOMIC STABILITY: TRANSPORTATION INSECURITY
IN THE PAST 12 MONTHS, HAS THE LACK OF TRANSPORTATION KEPT YOU FROM MEDICAL APPOINTMENTS OR FROM GETTING MEDICATIONS?: NO

## 2025-03-26 ENCOUNTER — RESULTS FOLLOW-UP (OUTPATIENT)
Facility: CLINIC | Age: 65
End: 2025-03-26

## 2025-03-26 ENCOUNTER — OFFICE VISIT (OUTPATIENT)
Facility: CLINIC | Age: 65
End: 2025-03-26
Payer: COMMERCIAL

## 2025-03-26 VITALS
RESPIRATION RATE: 18 BRPM | DIASTOLIC BLOOD PRESSURE: 86 MMHG | WEIGHT: 228 LBS | HEIGHT: 68 IN | BODY MASS INDEX: 34.56 KG/M2 | SYSTOLIC BLOOD PRESSURE: 132 MMHG | HEART RATE: 84 BPM | TEMPERATURE: 98.5 F | OXYGEN SATURATION: 98 %

## 2025-03-26 DIAGNOSIS — F90.0 ATTENTION DEFICIT HYPERACTIVITY DISORDER (ADHD), PREDOMINANTLY INATTENTIVE TYPE: Primary | ICD-10-CM

## 2025-03-26 DIAGNOSIS — C67.9 MALIGNANT NEOPLASM OF URINARY BLADDER, UNSPECIFIED SITE (HCC): ICD-10-CM

## 2025-03-26 DIAGNOSIS — N52.9 ERECTILE DYSFUNCTION, UNSPECIFIED ERECTILE DYSFUNCTION TYPE: ICD-10-CM

## 2025-03-26 DIAGNOSIS — E66.811 CLASS 1 DRUG-INDUCED OBESITY WITHOUT SERIOUS COMORBIDITY WITH BODY MASS INDEX (BMI) OF 34.0 TO 34.9 IN ADULT: ICD-10-CM

## 2025-03-26 DIAGNOSIS — E66.1 CLASS 1 DRUG-INDUCED OBESITY WITHOUT SERIOUS COMORBIDITY WITH BODY MASS INDEX (BMI) OF 34.0 TO 34.9 IN ADULT: ICD-10-CM

## 2025-03-26 DIAGNOSIS — R73.03 PREDIABETES: ICD-10-CM

## 2025-03-26 PROCEDURE — 99214 OFFICE O/P EST MOD 30 MIN: CPT | Performed by: NURSE PRACTITIONER

## 2025-03-26 RX ORDER — DEXTROAMPHETAMINE SACCHARATE, AMPHETAMINE ASPARTATE MONOHYDRATE, DEXTROAMPHETAMINE SULFATE AND AMPHETAMINE SULFATE 5; 5; 5; 5 MG/1; MG/1; MG/1; MG/1
20 CAPSULE, EXTENDED RELEASE ORAL EVERY MORNING
Qty: 90 CAPSULE | Refills: 0 | Status: SHIPPED | OUTPATIENT
Start: 2025-04-08 | End: 2025-07-07

## 2025-03-26 RX ORDER — DEXTROAMPHETAMINE SACCHARATE, AMPHETAMINE ASPARTATE MONOHYDRATE, DEXTROAMPHETAMINE SULFATE AND AMPHETAMINE SULFATE 5; 5; 5; 5 MG/1; MG/1; MG/1; MG/1
20 CAPSULE, EXTENDED RELEASE ORAL EVERY MORNING
Qty: 90 CAPSULE | Refills: 0 | Status: SHIPPED | OUTPATIENT
Start: 2025-07-06 | End: 2025-10-09

## 2025-03-26 RX ORDER — TADALAFIL 20 MG/1
TABLET ORAL
Qty: 30 TABLET | Refills: 1 | Status: SHIPPED | OUTPATIENT
Start: 2025-03-26

## 2025-03-26 ASSESSMENT — PATIENT HEALTH QUESTIONNAIRE - PHQ9
1. LITTLE INTEREST OR PLEASURE IN DOING THINGS: NOT AT ALL
SUM OF ALL RESPONSES TO PHQ QUESTIONS 1-9: 0
2. FEELING DOWN, DEPRESSED OR HOPELESS: NOT AT ALL

## 2025-03-26 NOTE — ASSESSMENT & PLAN NOTE
Monitored by specialist- no acute findings meriting change in the plan   Dr Hebert Valencia  - Advised to follow up with Dr. Valencia for a recheck in 05/2025.    3/29/2023:   Monitored by specialist- no acute findings meriting change in the plan   Completes Cystoscopy every 3m  Dr Hebert Valencia

## 2025-03-26 NOTE — ASSESSMENT & PLAN NOTE
- Currently taking Adderall XR 20 mg daily with no reported side effects such as jitteriness or agitation.  - Able to concentrate at home and work, with sleep not impacted, getting 6-8 hours per night.  - Prescription for Adderall XR 20 mg daily issued for 90 days, with the first refill due on 04/08/2025.   UDS and SAF obtained and reviewed  I have reviewed this patient's controlled substance prescription history through the Prescription Monitoring Program. No inconsistencies noted.    9/26/2024:   Well-controlled, continue current medications   UDS and sub agreement to be obtained in March    Sent Adderall XR 90 days x 2 to hold patient for 6 months    I have reviewed this patient's controlled substance prescription history through the Prescription Monitoring Program. No inconsistencies noted.   Post-Care Instructions: I reviewed with the patient in detail post-care instructions. Patient is to avoid sunlight for the next 2 days, and wear sun protection. Patients may expect sunburn like redness, discomfort and scabbing.

## 2025-03-26 NOTE — PROGRESS NOTES
Jan Oliva is a 64 y.o. male (: 1960) presenting to address:    Chief Complaint   Patient presents with    6 Month Follow-Up       Vitals:    25 0803   BP: 132/86   Pulse: 84   Resp: 18   Temp: 98.5 °F (36.9 °C)   SpO2: 98%       \"Have you been to the ER, urgent care clinic since your last visit?  Hospitalized since your last visit?\"    NO    “Have you seen or consulted any other health care providers outside of [facility] since your last visit?”    NO

## 2025-03-26 NOTE — ASSESSMENT & PLAN NOTE
- A1c has increased from 5.8 to 6.0, indicating prediabetes.  - Slight weight gain of about 7 pounds noted, potentially contributing to the elevated A1c.  - Advised to continue working on weight loss.    4/2/2024:  A1c 5.8  Recheck level 12 months  No treatment at this time    Reduce carbs and sweets in diet  Increase exercise to burn calories

## 2025-03-26 NOTE — PROGRESS NOTES
[facility] [address]  [address]  [place], [place] [address]  [phone] office/[phone] fax    3/26/2025    Jan Oliva 1960 is a pleasant White (non-) male.       History of Present Illness  The patient presents for a routine checkup.    The chief complaint is prediabetes, with an A1c increase from 5.8 to 6.0. Weight has fluctuated, with a gain of 7 pounds over the Maddy period, followed by a subsequent loss. Active efforts are being made towards further weight reduction.    Currently on a daily regimen of Adderall, which is reported as effective in managing symptoms without adverse effects such as jitteriness or agitation. Maintains a full-time job as a  and is considering senior living next year. Sleep pattern is regular, averaging between 6 to 8 hours per night. Anticipates the need for a prescription refill in the coming weeks.    A history of urinary bladder neoplasm, with a scheduled recheck appointment with Dr. Valencia in 05/2025. Cystoscopies every 3 months were stopped a couple of years ago.    Also taking Cialis and requires a refill.    SOCIAL HISTORY  He works full time as a .    FAMILY HISTORY  His sister had bladder cancer.      Physical Exam      Physical Exam  Constitutional:       Appearance: Normal appearance.   HENT:      Head: Normocephalic and atraumatic.      Right Ear: Tympanic membrane, ear canal and external ear normal.      Left Ear: Tympanic membrane, ear canal and external ear normal.      Nose: Nose normal.      Mouth/Throat:      Mouth: Mucous membranes are moist.   Eyes:      Extraocular Movements: Extraocular movements intact.      Conjunctiva/sclera: Conjunctivae normal.   Neck:      Vascular: No carotid bruit.   Cardiovascular:      Rate and Rhythm: Normal rate and regular rhythm.      Pulses: Normal pulses.      Heart sounds: Normal heart sounds.   Pulmonary:      Effort: Pulmonary effort is

## 2025-03-26 NOTE — ASSESSMENT & PLAN NOTE
+7lbs in 12m  BMI is out of normal parameters and plan is as follows: Discussed in great detail on diet, portion control, exercise, avoiding foods high in sugar, carbs and starches, fatty/greasy foods and to eat until satisfied not til full. I have counseled this patient on diet and exercise regimens as well.      4/3/2024:  -10lbs in 12m

## 2025-05-07 PROBLEM — R12 HEART BURN: Status: ACTIVE | Noted: 2023-03-29

## 2025-07-03 DIAGNOSIS — F90.0 ATTENTION DEFICIT HYPERACTIVITY DISORDER (ADHD), PREDOMINANTLY INATTENTIVE TYPE: ICD-10-CM

## 2025-07-03 RX ORDER — DEXTROAMPHETAMINE SACCHARATE, AMPHETAMINE ASPARTATE MONOHYDRATE, DEXTROAMPHETAMINE SULFATE AND AMPHETAMINE SULFATE 5; 5; 5; 5 MG/1; MG/1; MG/1; MG/1
20 CAPSULE, EXTENDED RELEASE ORAL EVERY MORNING
Qty: 90 CAPSULE | Refills: 0 | OUTPATIENT
Start: 2025-07-03 | End: 2025-10-01

## 2025-07-03 NOTE — TELEPHONE ENCOUNTER
[facility] reviewed 7/3/2025     The last fill date for Dextroamp-Amphet Er 20 Mg Cap  was 4/9/2025 for a 90 d/s qty 90      Last UDS: completed     CSA Last signed:4/8/2025        PCP: Nicolette Shetty DNP    Last appt: 3/26/2025   Future Appointments   Date Time Provider Department Center   9/22/2025 11:30 AM Nicolette Shetty DNP [facility]   5/13/2026  8:30 AM Hebert Valencia MD [facility] Kayla Sched       Requested Prescriptions     Pending Prescriptions Disp Refills    amphetamine-dextroamphetamine (ADDERALL XR) 20 MG extended release capsule 90 capsule 0     Sig: Take 1 capsule by mouth every morning for 90 days. Max Daily Amount: 20 mg

## 2025-07-09 RX ORDER — DEXTROAMPHETAMINE SACCHARATE, AMPHETAMINE ASPARTATE MONOHYDRATE, DEXTROAMPHETAMINE SULFATE AND AMPHETAMINE SULFATE 5; 5; 5; 5 MG/1; MG/1; MG/1; MG/1
20 CAPSULE, EXTENDED RELEASE ORAL EVERY MORNING
Qty: 90 CAPSULE | Refills: 0 | Status: SHIPPED | OUTPATIENT
Start: 2025-07-09 | End: 2025-10-12

## 2025-07-09 NOTE — TELEPHONE ENCOUNTER
Requested Prescriptions     Signed Prescriptions Disp Refills    amphetamine-dextroamphetamine (ADDERALL XR) 20 MG extended release capsule 90 capsule 0     Sig: Take 1 capsule by mouth every morning for 95 days. Max Daily Amount: 20 mg     Authorizing Provider: BRYNN KIM     Refused Prescriptions Disp Refills    amphetamine-dextroamphetamine (ADDERALL XR) 20 MG extended release capsule 90 capsule 0     Sig: Take 1 capsule by mouth every morning for 90 days. Max Daily Amount: 20 mg     Refused By: BRYNN KIM     Reason for Refusal: Request already responded to by other means (e.g. phone or fax)     I have reviewed this patient's controlled substance prescription history through the Prescription Monitoring Program. No inconsistencies noted.

## 2025-07-09 NOTE — TELEPHONE ENCOUNTER
Prescription needs to be updated with new pharmacy. Patient no longer using [facility]. Patient has one day left of medication     Please update and send to  [facility] [id number] - [address]

## 2025-08-24 DIAGNOSIS — N52.9 ERECTILE DYSFUNCTION, UNSPECIFIED ERECTILE DYSFUNCTION TYPE: ICD-10-CM

## 2025-08-25 RX ORDER — TADALAFIL 20 MG/1
TABLET ORAL
Qty: 30 TABLET | Refills: 0 | Status: SHIPPED | OUTPATIENT
Start: 2025-08-25